# Patient Record
Sex: FEMALE | Race: WHITE | NOT HISPANIC OR LATINO | URBAN - METROPOLITAN AREA
[De-identification: names, ages, dates, MRNs, and addresses within clinical notes are randomized per-mention and may not be internally consistent; named-entity substitution may affect disease eponyms.]

---

## 2020-01-06 ENCOUNTER — HOSPITAL ENCOUNTER (OUTPATIENT)
Dept: URGENT CARE | Facility: CLINIC | Age: 20
Discharge: HOME | End: 2020-01-06
Attending: PHYSICIAN ASSISTANT

## 2020-12-28 ENCOUNTER — HOSPITAL ENCOUNTER (OUTPATIENT)
Dept: URGENT CARE | Facility: CLINIC | Age: 20
Discharge: HOME | End: 2020-12-28
Attending: PHYSICIAN ASSISTANT

## 2020-12-30 LAB — SARS-COV-2 RNA SPEC QL NAA+PROBE: NOT DETECTED

## 2021-03-09 ENCOUNTER — HOSPITAL ENCOUNTER (OUTPATIENT)
Dept: URGENT CARE | Facility: CLINIC | Age: 21
Discharge: HOME | End: 2021-03-09
Attending: EMERGENCY MEDICINE

## 2021-03-11 LAB — SARS-COV-2 RNA SPEC QL NAA+PROBE: NOT DETECTED

## 2021-04-02 ENCOUNTER — HOSPITAL ENCOUNTER (OUTPATIENT)
Dept: URGENT CARE | Facility: CLINIC | Age: 21
Discharge: HOME | End: 2021-04-02
Attending: EMERGENCY MEDICINE

## 2021-04-03 LAB — SARS-COV-2 RNA SPEC QL NAA+PROBE: NOT DETECTED

## 2021-04-19 ENCOUNTER — HOSPITAL ENCOUNTER (OUTPATIENT)
Dept: URGENT CARE | Facility: CLINIC | Age: 21
Discharge: HOME | End: 2021-04-19
Attending: EMERGENCY MEDICINE

## 2021-04-20 LAB — SARS-COV-2 RNA SPEC QL NAA+PROBE: NOT DETECTED

## 2023-07-18 ENCOUNTER — PATIENT ROUNDING (BHMG ONLY) (OUTPATIENT)
Dept: OBSTETRICS AND GYNECOLOGY | Facility: CLINIC | Age: 23
End: 2023-07-18

## 2023-07-18 NOTE — PROGRESS NOTES
A My-Chart message has been sent to the patient for PATIENT ROUNDING with [facility].

## 2023-08-16 ENCOUNTER — OFFICE VISIT (OUTPATIENT)
Dept: OBSTETRICS AND GYNECOLOGY | Facility: CLINIC | Age: 23
End: 2023-08-16
Payer: COMMERCIAL

## 2023-08-16 VITALS
DIASTOLIC BLOOD PRESSURE: 79 MMHG | BODY MASS INDEX: 37.03 KG/M2 | HEART RATE: 105 BPM | SYSTOLIC BLOOD PRESSURE: 124 MMHG | HEIGHT: 63 IN | WEIGHT: 209 LBS

## 2023-08-16 DIAGNOSIS — R63.5 WEIGHT GAIN: ICD-10-CM

## 2023-08-16 DIAGNOSIS — L68.0 FEMALE HIRSUTISM: ICD-10-CM

## 2023-08-16 DIAGNOSIS — N91.4 SECONDARY OLIGOMENORRHEA: Primary | ICD-10-CM

## 2023-08-16 DIAGNOSIS — Z32.02 NEGATIVE PREGNANCY TEST: ICD-10-CM

## 2023-08-16 DIAGNOSIS — L70.9 ACNE, UNSPECIFIED ACNE TYPE: ICD-10-CM

## 2023-08-16 LAB
B-HCG UR QL: NEGATIVE
EXPIRATION DATE: NORMAL
INTERNAL NEGATIVE CONTROL: NEGATIVE
INTERNAL POSITIVE CONTROL: POSITIVE
Lab: NORMAL

## 2023-08-16 RX ORDER — CITALOPRAM 40 MG/1
1 TABLET ORAL DAILY
COMMUNITY
Start: 2023-08-02

## 2023-08-16 NOTE — PROGRESS NOTES
"GYN Problem/Follow Up Visit    Chief Complaint   Patient presents with    Discuss BC           HPI  Asha Araujo is a 23 y.o. female, , who presents for skipping menses. States long hx of skipping. Has skipped almost one year in the past. Also c/o weight gain, acne, and unwanted facial and chin hair.  is  and has not been on bc and has never gotten pregnant. Has never had any workup for these sx.       Additional OB/GYN History   Patient's last menstrual period was 2023 (approximate).  Current contraception: contraceptive methods: None  Allergies : Amoxicillin     The additional following portions of the patient's history were reviewed and updated as appropriate: allergies, current medications, past family history, past medical history, past social history, past surgical history, and problem list.    Review of Systems    I have reviewed and agree with the HPI, ROS, and historical information as entered above. Maritza Hopkins, APRN    Objective   /79   Pulse 105   Ht 160 cm (63\")   Wt 94.8 kg (209 lb)   LMP 2023 (Approximate)   BMI 37.02 kg/mý     Physical Exam  Vitals reviewed.   Neurological:      Mental Status: She is alert and oriented to person, place, and time.          Assessment and Plan    Diagnoses and all orders for this visit:    1. Secondary oligomenorrhea (Primary)  -     17-Hydroxyprogesterone; Future  -     Comprehensive Metabolic Panel; Future  -     DHEA-Sulfate; Future  -     Testosterone, Bioavailable (M); Future  -     Insulin, Total; Future  -     Prolactin; Future  -     T4, Free; Future  -     TSH; Future  -     US Non-ob Transvaginal; Future    2. Weight gain  -     17-Hydroxyprogesterone; Future  -     Comprehensive Metabolic Panel; Future  -     DHEA-Sulfate; Future  -     Testosterone, Bioavailable (M); Future  -     Insulin, Total; Future  -     Prolactin; Future  -     T4, Free; Future  -     TSH; Future  -     US Non-ob Transvaginal; Future    3. " Acne, unspecified acne type  -     17-Hydroxyprogesterone; Future  -     Comprehensive Metabolic Panel; Future  -     DHEA-Sulfate; Future  -     Testosterone, Bioavailable (M); Future  -     Insulin, Total; Future  -     Prolactin; Future  -     T4, Free; Future  -     TSH; Future  -     US Non-ob Transvaginal; Future    4. Female hirsutism  -     17-Hydroxyprogesterone; Future  -     Comprehensive Metabolic Panel; Future  -     DHEA-Sulfate; Future  -     Testosterone, Bioavailable (M); Future  -     Insulin, Total; Future  -     Prolactin; Future  -     T4, Free; Future  -     TSH; Future  -     US Non-ob Transvaginal; Future    5. Negative pregnancy test  -     POC Pregnancy, Urine    Will check fasting pcos panel and pelvic u/s. Discussed tx options including provera and bc options. We will discuss further at f/u appt. Urine preg test negative.    Counseling:  She understands the importance of having the above orders performed in a timely fashion.  She is encouraged to review her results online and/or contact or office if she has questions.     Follow Up:  Return for fasting pcos panel, then lab and u/s f/u.      ARON Garcia  08/16/2023

## 2023-08-17 ENCOUNTER — LAB (OUTPATIENT)
Dept: OBSTETRICS AND GYNECOLOGY | Facility: CLINIC | Age: 23
End: 2023-08-17
Payer: COMMERCIAL

## 2023-08-17 DIAGNOSIS — L70.9 ACNE, UNSPECIFIED ACNE TYPE: ICD-10-CM

## 2023-08-17 DIAGNOSIS — R63.5 WEIGHT GAIN: ICD-10-CM

## 2023-08-17 DIAGNOSIS — N91.4 SECONDARY OLIGOMENORRHEA: ICD-10-CM

## 2023-08-17 DIAGNOSIS — L68.0 FEMALE HIRSUTISM: ICD-10-CM

## 2023-08-17 LAB
ALBUMIN SERPL-MCNC: 4.4 G/DL (ref 3.5–5.2)
ALBUMIN/GLOB SERPL: 1.7 G/DL
ALP SERPL-CCNC: 82 U/L (ref 39–117)
ALT SERPL W P-5'-P-CCNC: 21 U/L (ref 1–33)
ANION GAP SERPL CALCULATED.3IONS-SCNC: 9.6 MMOL/L (ref 5–15)
AST SERPL-CCNC: 21 U/L (ref 1–32)
BILIRUB SERPL-MCNC: 0.3 MG/DL (ref 0–1.2)
BUN SERPL-MCNC: 10 MG/DL (ref 6–20)
BUN/CREAT SERPL: 13.5 (ref 7–25)
CALCIUM SPEC-SCNC: 9.6 MG/DL (ref 8.6–10.5)
CHLORIDE SERPL-SCNC: 105 MMOL/L (ref 98–107)
CO2 SERPL-SCNC: 24.4 MMOL/L (ref 22–29)
CREAT SERPL-MCNC: 0.74 MG/DL (ref 0.57–1)
EGFRCR SERPLBLD CKD-EPI 2021: 115.3 ML/MIN/1.73
GLOBULIN UR ELPH-MCNC: 2.6 GM/DL
GLUCOSE SERPL-MCNC: 102 MG/DL (ref 65–99)
POTASSIUM SERPL-SCNC: 4.4 MMOL/L (ref 3.5–5.2)
PROLACTIN SERPL-MCNC: 15.9 NG/ML (ref 4.79–23.3)
PROT SERPL-MCNC: 7 G/DL (ref 6–8.5)
SODIUM SERPL-SCNC: 139 MMOL/L (ref 136–145)
T4 FREE SERPL-MCNC: 0.91 NG/DL (ref 0.93–1.7)
TSH SERPL DL<=0.05 MIU/L-ACNC: 1.7 UIU/ML (ref 0.27–4.2)

## 2023-08-17 PROCEDURE — 84146 ASSAY OF PROLACTIN: CPT | Performed by: OBSTETRICS & GYNECOLOGY

## 2023-08-17 PROCEDURE — 80053 COMPREHEN METABOLIC PANEL: CPT | Performed by: OBSTETRICS & GYNECOLOGY

## 2023-08-17 PROCEDURE — 84439 ASSAY OF FREE THYROXINE: CPT | Performed by: OBSTETRICS & GYNECOLOGY

## 2023-08-17 PROCEDURE — 84443 ASSAY THYROID STIM HORMONE: CPT | Performed by: OBSTETRICS & GYNECOLOGY

## 2023-08-18 LAB
DHEA-S SERPL-MCNC: 340 UG/DL (ref 84.8–378)
INSULIN SERPL-ACNC: 27.9 UIU/ML (ref 2.6–24.9)

## 2023-08-20 LAB — 17OHP SERPL-MCNC: 46 NG/DL

## 2023-08-21 ENCOUNTER — TELEPHONE (OUTPATIENT)
Dept: OBSTETRICS AND GYNECOLOGY | Facility: CLINIC | Age: 23
End: 2023-08-21
Payer: COMMERCIAL

## 2023-08-25 LAB
TESTOST SERPL-MCNC: 32 NG/DL
TESTOSTERONE.FREE+WB MFR SERPL: 57.2 %
TESTOSTERONE.FREE+WB SERPL-MCNC: 18 NG/DL

## 2023-08-28 ENCOUNTER — TELEPHONE (OUTPATIENT)
Dept: OBSTETRICS AND GYNECOLOGY | Facility: CLINIC | Age: 23
End: 2023-08-28
Payer: COMMERCIAL

## 2023-09-07 ENCOUNTER — HOSPITAL ENCOUNTER (OUTPATIENT)
Dept: ULTRASOUND IMAGING | Facility: HOSPITAL | Age: 23
Discharge: HOME | End: 2023-09-07
Admitting: OBSTETRICS & GYNECOLOGY
Payer: COMMERCIAL

## 2023-09-07 DIAGNOSIS — L68.0 FEMALE HIRSUTISM: ICD-10-CM

## 2023-09-07 DIAGNOSIS — L70.9 ACNE, UNSPECIFIED ACNE TYPE: ICD-10-CM

## 2023-09-07 DIAGNOSIS — N91.4 SECONDARY OLIGOMENORRHEA: ICD-10-CM

## 2023-09-07 DIAGNOSIS — R63.5 WEIGHT GAIN: ICD-10-CM

## 2023-09-07 PROCEDURE — 76830 TRANSVAGINAL US NON-OB: CPT

## 2023-09-11 ENCOUNTER — TELEPHONE (OUTPATIENT)
Dept: OBSTETRICS AND GYNECOLOGY | Facility: CLINIC | Age: 23
End: 2023-09-11
Payer: COMMERCIAL

## 2023-09-21 ENCOUNTER — OFFICE VISIT (OUTPATIENT)
Dept: OBSTETRICS AND GYNECOLOGY | Facility: CLINIC | Age: 23
End: 2023-09-21
Payer: COMMERCIAL

## 2023-09-21 VITALS
BODY MASS INDEX: 36.75 KG/M2 | SYSTOLIC BLOOD PRESSURE: 113 MMHG | WEIGHT: 207.4 LBS | DIASTOLIC BLOOD PRESSURE: 77 MMHG | HEART RATE: 85 BPM | HEIGHT: 63 IN

## 2023-09-21 DIAGNOSIS — Z30.011 ENCOUNTER FOR INITIAL PRESCRIPTION OF CONTRACEPTIVE PILLS: ICD-10-CM

## 2023-09-21 DIAGNOSIS — N91.4 SECONDARY OLIGOMENORRHEA: Primary | ICD-10-CM

## 2023-09-21 DIAGNOSIS — E88.81 INSULIN RESISTANCE: ICD-10-CM

## 2023-09-21 RX ORDER — NORGESTIMATE AND ETHINYL ESTRADIOL 7DAYSX3 LO
1 KIT ORAL DAILY
Qty: 84 TABLET | Refills: 3 | Status: SHIPPED | OUTPATIENT
Start: 2023-09-21 | End: 2024-09-20

## 2023-09-21 NOTE — PROGRESS NOTES
"GYN Problem/Follow Up Visit    Chief Complaint   Patient presents with    FOLLOW UP LABS AND ULTRASOUND           HPI  Asha Araujo is a 23 y.o. female, , who presents for lab and u/s f/u. Seen for skipping menses, acne, and facial hair. No new concerns.       Additional OB/GYN History   Patient's last menstrual period was 2023 (approximate).  Current contraception: contraceptive methods: None  Desires to: start contraception  Allergies : Amoxicillin     The additional following portions of the patient's history were reviewed and updated as appropriate: allergies, current medications, past family history, past medical history, past social history, past surgical history, and problem list.    Review of Systems    I have reviewed and agree with the HPI, ROS, and historical information as entered above. Maritza Hopkins, APRN    Objective   /77   Pulse 85   Ht 160 cm (63\")   Wt 94.1 kg (207 lb 6.4 oz)   LMP 2023 (Approximate)   BMI 36.74 kg/m²     Physical Exam  Vitals reviewed.   Neurological:      Mental Status: She is alert and oriented to person, place, and time.          Assessment and Plan    Diagnoses and all orders for this visit:    1. Secondary oligomenorrhea (Primary)  -     norgestimate-ethinyl estradiol (Ortho Tri-Cyclen Lo) 0.18/0.215/0.25 MG-25 MCG per tablet; Take 1 tablet by mouth Daily.  Dispense: 84 tablet; Refill: 3    2. Insulin resistance  -     metFORMIN (GLUCOPHAGE) 850 MG tablet; Take 1 tablet by mouth 2 (Two) Times a Day With Meals.  Dispense: 60 tablet; Refill: 11    3. Encounter for initial prescription of contraceptive pills  -     norgestimate-ethinyl estradiol (Ortho Tri-Cyclen Lo) 0.18/0.215/0.25 MG-25 MCG per tablet; Take 1 tablet by mouth Daily.  Dispense: 84 tablet; Refill: 3    Reviewed pcos panel done 23: testosterone slightly elevated at 18, insulin 27.9, glucose 102, free t4 0.91. reviewed pelvic u/s done 23: normal. Discussed possible borderline " pcos and insulin resistance and tx options including r/b/se. Pt desires trying metformin. She will also work on decreasing carbs/sugars. She also desires starting bcp to help with sx. She has seen her pcp since the last ov and they reviewed everything. She will f/u here prn.     Counseling:  She understands the importance of having the above orders performed in a timely fashion.  She is encouraged to review her results online and/or contact or office if she has questions.     Follow Up:  Return if symptoms worsen or fail to improve.      Maritza Hopkins, APRN  09/21/2023

## 2024-09-11 DIAGNOSIS — Z30.011 ENCOUNTER FOR INITIAL PRESCRIPTION OF CONTRACEPTIVE PILLS: ICD-10-CM

## 2024-09-11 DIAGNOSIS — N91.4 SECONDARY OLIGOMENORRHEA: ICD-10-CM

## 2024-09-11 RX ORDER — NORGESTIMATE AND ETHINYL ESTRADIOL 7DAYSX3 LO
1 KIT ORAL DAILY
Qty: 84 TABLET | Refills: 0 | Status: SHIPPED | OUTPATIENT
Start: 2024-09-11 | End: 2025-09-11

## 2024-10-29 ENCOUNTER — TRANSCRIBE ORDERS (OUTPATIENT)
Dept: ADMINISTRATIVE | Facility: HOSPITAL | Age: 24
End: 2024-10-29
Payer: COMMERCIAL

## 2024-10-29 DIAGNOSIS — R19.7 NAUSEA, VOMITING AND DIARRHEA: Primary | ICD-10-CM

## 2024-10-29 DIAGNOSIS — R11.2 NAUSEA, VOMITING AND DIARRHEA: Primary | ICD-10-CM

## 2024-12-09 DIAGNOSIS — Z30.011 ENCOUNTER FOR INITIAL PRESCRIPTION OF CONTRACEPTIVE PILLS: ICD-10-CM

## 2024-12-09 DIAGNOSIS — N91.4 SECONDARY OLIGOMENORRHEA: ICD-10-CM

## 2024-12-10 RX ORDER — NORGESTIMATE AND ETHINYL ESTRADIOL 7DAYSX3 LO
1 KIT ORAL DAILY
Qty: 84 TABLET | Refills: 0 | Status: SHIPPED | OUTPATIENT
Start: 2024-12-10

## 2025-01-17 ENCOUNTER — CLINICAL SUPPORT (OUTPATIENT)
Dept: FAMILY MEDICINE CLINIC | Facility: CLINIC | Age: 25
End: 2025-01-17
Payer: COMMERCIAL

## 2025-01-17 DIAGNOSIS — R05.1 ACUTE COUGH: Primary | ICD-10-CM

## 2025-01-17 LAB
EXPIRATION DATE: NORMAL
FLUAV AG UPPER RESP QL IA.RAPID: NOT DETECTED
FLUBV AG UPPER RESP QL IA.RAPID: NOT DETECTED
INTERNAL CONTROL: NORMAL
Lab: NORMAL
SARS-COV-2 AG UPPER RESP QL IA.RAPID: NOT DETECTED

## 2025-01-23 ENCOUNTER — LAB (OUTPATIENT)
Dept: LAB | Facility: HOSPITAL | Age: 25
End: 2025-01-23
Payer: COMMERCIAL

## 2025-01-23 ENCOUNTER — OFFICE VISIT (OUTPATIENT)
Dept: FAMILY MEDICINE CLINIC | Facility: CLINIC | Age: 25
End: 2025-01-23
Payer: COMMERCIAL

## 2025-01-23 VITALS
SYSTOLIC BLOOD PRESSURE: 116 MMHG | OXYGEN SATURATION: 96 % | WEIGHT: 228 LBS | HEART RATE: 91 BPM | BODY MASS INDEX: 41.96 KG/M2 | DIASTOLIC BLOOD PRESSURE: 43 MMHG | HEIGHT: 62 IN

## 2025-01-23 DIAGNOSIS — Z13.220 SCREENING FOR LIPID DISORDERS: ICD-10-CM

## 2025-01-23 DIAGNOSIS — Z11.59 NEED FOR HEPATITIS C SCREENING TEST: ICD-10-CM

## 2025-01-23 DIAGNOSIS — R19.7 DIARRHEA, UNSPECIFIED TYPE: ICD-10-CM

## 2025-01-23 DIAGNOSIS — Z91.018 FOOD ALLERGY: ICD-10-CM

## 2025-01-23 DIAGNOSIS — Z76.89 ENCOUNTER TO ESTABLISH CARE: ICD-10-CM

## 2025-01-23 DIAGNOSIS — R10.9 ABDOMINAL CRAMPS: ICD-10-CM

## 2025-01-23 DIAGNOSIS — Z00.00 ANNUAL PHYSICAL EXAM: ICD-10-CM

## 2025-01-23 DIAGNOSIS — E88.819 INSULIN RESISTANCE: ICD-10-CM

## 2025-01-23 DIAGNOSIS — F41.9 ANXIETY: ICD-10-CM

## 2025-01-23 DIAGNOSIS — Z23 NEED FOR TDAP VACCINATION: ICD-10-CM

## 2025-01-23 DIAGNOSIS — Z13.29 SCREENING FOR THYROID DISORDER: ICD-10-CM

## 2025-01-23 DIAGNOSIS — Z00.00 ANNUAL PHYSICAL EXAM: Primary | ICD-10-CM

## 2025-01-23 DIAGNOSIS — F32.A DEPRESSION, UNSPECIFIED DEPRESSION TYPE: ICD-10-CM

## 2025-01-23 LAB
ALBUMIN SERPL-MCNC: 3.8 G/DL (ref 3.5–5.2)
ALBUMIN/GLOB SERPL: 1.4 G/DL
ALP SERPL-CCNC: 85 U/L (ref 39–117)
ALT SERPL W P-5'-P-CCNC: 24 U/L (ref 1–33)
ANION GAP SERPL CALCULATED.3IONS-SCNC: 12.7 MMOL/L (ref 5–15)
AST SERPL-CCNC: 16 U/L (ref 1–32)
BASOPHILS # BLD AUTO: 0.06 10*3/MM3 (ref 0–0.2)
BASOPHILS NFR BLD AUTO: 0.5 % (ref 0–1.5)
BILIRUB SERPL-MCNC: <0.2 MG/DL (ref 0–1.2)
BUN SERPL-MCNC: 11 MG/DL (ref 6–20)
BUN/CREAT SERPL: 15.1 (ref 7–25)
CALCIUM SPEC-SCNC: 9.2 MG/DL (ref 8.6–10.5)
CHLORIDE SERPL-SCNC: 104 MMOL/L (ref 98–107)
CHOLEST SERPL-MCNC: 216 MG/DL (ref 0–200)
CO2 SERPL-SCNC: 21.3 MMOL/L (ref 22–29)
CREAT SERPL-MCNC: 0.73 MG/DL (ref 0.57–1)
DEPRECATED RDW RBC AUTO: 40.3 FL (ref 37–54)
EGFRCR SERPLBLD CKD-EPI 2021: 116.5 ML/MIN/1.73
EOSINOPHIL # BLD AUTO: 0.19 10*3/MM3 (ref 0–0.4)
EOSINOPHIL NFR BLD AUTO: 1.7 % (ref 0.3–6.2)
ERYTHROCYTE [DISTWIDTH] IN BLOOD BY AUTOMATED COUNT: 12.2 % (ref 12.3–15.4)
GLOBULIN UR ELPH-MCNC: 2.8 GM/DL
GLUCOSE SERPL-MCNC: 95 MG/DL (ref 65–99)
HCT VFR BLD AUTO: 41.6 % (ref 34–46.6)
HCV AB SER QL: NORMAL
HDLC SERPL-MCNC: 47 MG/DL (ref 40–60)
HGB BLD-MCNC: 14.2 G/DL (ref 12–15.9)
IMM GRANULOCYTES # BLD AUTO: 0.16 10*3/MM3 (ref 0–0.05)
IMM GRANULOCYTES NFR BLD AUTO: 1.4 % (ref 0–0.5)
LDLC SERPL CALC-MCNC: 130 MG/DL (ref 0–100)
LDLC/HDLC SERPL: 2.65 {RATIO}
LYMPHOCYTES # BLD AUTO: 2.18 10*3/MM3 (ref 0.7–3.1)
LYMPHOCYTES NFR BLD AUTO: 19.2 % (ref 19.6–45.3)
MCH RBC QN AUTO: 31.1 PG (ref 26.6–33)
MCHC RBC AUTO-ENTMCNC: 34.1 G/DL (ref 31.5–35.7)
MCV RBC AUTO: 91.2 FL (ref 79–97)
MONOCYTES # BLD AUTO: 0.99 10*3/MM3 (ref 0.1–0.9)
MONOCYTES NFR BLD AUTO: 8.7 % (ref 5–12)
NEUTROPHILS NFR BLD AUTO: 68.5 % (ref 42.7–76)
NEUTROPHILS NFR BLD AUTO: 7.8 10*3/MM3 (ref 1.7–7)
NRBC BLD AUTO-RTO: 0 /100 WBC (ref 0–0.2)
PLATELET # BLD AUTO: 293 10*3/MM3 (ref 140–450)
PMV BLD AUTO: 10.3 FL (ref 6–12)
POTASSIUM SERPL-SCNC: 4.2 MMOL/L (ref 3.5–5.2)
PROT SERPL-MCNC: 6.6 G/DL (ref 6–8.5)
RBC # BLD AUTO: 4.56 10*6/MM3 (ref 3.77–5.28)
SODIUM SERPL-SCNC: 138 MMOL/L (ref 136–145)
T4 FREE SERPL-MCNC: 0.88 NG/DL (ref 0.92–1.68)
TRIGL SERPL-MCNC: 222 MG/DL (ref 0–150)
TSH SERPL DL<=0.05 MIU/L-ACNC: 3.76 UIU/ML (ref 0.27–4.2)
VLDLC SERPL-MCNC: 39 MG/DL (ref 5–40)
WBC NRBC COR # BLD AUTO: 11.38 10*3/MM3 (ref 3.4–10.8)

## 2025-01-23 PROCEDURE — 86803 HEPATITIS C AB TEST: CPT

## 2025-01-23 PROCEDURE — 86364 TISS TRNSGLTMNASE EA IG CLAS: CPT

## 2025-01-23 PROCEDURE — 80061 LIPID PANEL: CPT

## 2025-01-23 PROCEDURE — 36415 COLL VENOUS BLD VENIPUNCTURE: CPT

## 2025-01-23 PROCEDURE — 99214 OFFICE O/P EST MOD 30 MIN: CPT

## 2025-01-23 PROCEDURE — 80050 GENERAL HEALTH PANEL: CPT

## 2025-01-23 PROCEDURE — 90471 IMMUNIZATION ADMIN: CPT

## 2025-01-23 PROCEDURE — 86231 EMA EACH IG CLASS: CPT

## 2025-01-23 PROCEDURE — 99395 PREV VISIT EST AGE 18-39: CPT

## 2025-01-23 PROCEDURE — 84439 ASSAY OF FREE THYROXINE: CPT

## 2025-01-23 PROCEDURE — 82784 ASSAY IGA/IGD/IGG/IGM EACH: CPT

## 2025-01-23 PROCEDURE — 90715 TDAP VACCINE 7 YRS/> IM: CPT

## 2025-01-23 RX ORDER — METFORMIN HYDROCHLORIDE 500 MG/1
500 TABLET, EXTENDED RELEASE ORAL
Qty: 30 TABLET | Refills: 0 | Status: SHIPPED | OUTPATIENT
Start: 2025-01-23

## 2025-01-23 RX ORDER — DICYCLOMINE HYDROCHLORIDE 10 MG/1
10 CAPSULE ORAL
Qty: 180 CAPSULE | Refills: 0 | Status: SHIPPED | OUTPATIENT
Start: 2025-01-23

## 2025-01-23 RX ORDER — LEVETIRACETAM 250 MG/1
250 TABLET ORAL 2 TIMES DAILY
COMMUNITY
Start: 2025-01-16 | End: 2026-01-16

## 2025-01-23 NOTE — LETTER
[facility]  Vaccine Consent Form    Patient Name:  Asha Araujo  Patient :  2000        Screening Checklist  The following questions should be completed prior to vaccination. If you answer “yes” to any question, it does not necessarily mean you should not be vaccinated. It just means we may need to clarify or ask more questions. If a question is unclear, please ask your healthcare provider to explain it.    Yes No   Any fever or moderate to severe illness today (mild illness and/or antibiotic treatment are not contraindications)?     Do you have a history of a serious reaction to any previous vaccinations, such as anaphylaxis, encephalopathy within 7 days, Guillain-Greenwood syndrome within 6 weeks, seizure?     Have you received any live vaccine(s) (e.g MMR, EBONY) or any other vaccines in the last month (to ensure duplicate doses aren't given)?     Do you have an anaphylactic allergy to latex (DTaP, DTaP-IPV, Hep A, Hep B, MenB, RV, Td, Tdap), baker’s yeast (Hep B, HPV), polysorbates (RSV, nirsevimab, PCV 20, Rotavirrus, Tdap, Shingrix), or gelatin (EBONY, MMR)?     Do you have an anaphylactic allergy to neomycin (Rabies, EBONY, MMR, IPV, Hep A), polymyxin B (IPV), or streptomycin (IPV)?      Any cancer, leukemia, AIDS, or other immune system disorder? (EBONY, MMR, RV)     Do you have a parent, brother, or sister with an immune system problem (if immune competence of vaccine recipient clinically verified, can proceed)? (MMR, EBONY)     Any recent steroid treatments for >2 weeks, chemotherapy, or radiation treatment? (EBONY, MMR)     Have you received antibody-containing blood transfusions or IVIG in the past 11 months (recommended interval is dependent on product)? (MMR, EBONY)     Have you taken antiviral drugs (acyclovir, famciclovir, valacyclovir for EBONY) in the last 24 or 48 hours, respectively?      Are you pregnant or planning to become pregnant within 1 month? (EBONY, MMR, HPV, IPV, MenB, Abrexvy; For Hep B-  "refer to Engerix-B; For RSV - Abrysvo is indicated for 32-36 weeks of pregnancy from September to January)     For infants, have you ever been told your child has had intussusception or a medical emergency involving obstruction of the intestine (Rotavirus)? If not for an infant, can skip this question.         *Ordering Physicians/APC should be consulted if \"yes\" is checked by the patient or guardian above.  I have received, read, and understand the Vaccine Information Statement (VIS) for each vaccine ordered.  I have considered my or my child's health status as well as the health status of my close contacts.  I have taken the opportunity to discuss my vaccine questions with my or my child's health care provider.   I have requested that the ordered vaccine(s) be given to me or my child.  I understand the benefits and risks of the vaccines.  I understand that I should remain in the clinic for 15 minutes after receiving the vaccine(s).  _________________________________________________________  Signature of Patient or Parent/Legal Guardian ____________________  Date     "

## 2025-01-23 NOTE — PROGRESS NOTES
Chief Complaint  Establish Care and Annual Exam    SUBJECTIVE  Asha Araujo presents to [facility]    History of Present Illness  Patient is a 24-year-old female who presents today to establish care.  Previous PCP was ARON Rojas. She is due annual physical exam, to be done in office today.  Needs chronic condition management of anxiety and depression. She is on Celexa, Rexulti and Buspar. She feels stable on these at this time. She would like to be referred to psych to for potential therapy.     She has been tested for food allergist by previous PCP. They came back positive but she is not sure what for.She was referred to allergist but they id not take her insurance at the time. She would like a new referral to go over her allergies. She has been having diarrhea and ABD pain after eating for a couple of years now. Denies any family hx of celiac. She notices raw fruits and veggies really trigger her.     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     PAP- 7/18/2023- Maritza Hopkins. JAGUAR is filling her birth control and had prescribed Metformin for Insulin resistance, but the pt states that it bother her GI tract. She said that she has not been taking. She was RX immediate release 850, never tried XR form. She repots if she goes extended period of time without eating  she has weakness, sweating, all signs of hypoglycemia.     TDAP- today    HPV- Pt believes that she has had HPV vaccine and will check previous records.     Patient is due labs. Orders placed at today's visit. Discussed with patient that these are fasting labs.     No other concerns or complaints at this time.  Patient denies any constipation, blood in stool, urinary urgency, frequency, or dysuria, chest pain, shortness of breath or syncope.           Past Medical History:   Diagnosis Date    Anxiety     Depression       Family History   Problem Relation Age of Onset    COPD  "Mother     Depression Mother     Anxiety disorder Father     Diabetes Paternal Grandmother     Breast cancer Neg Hx     Ovarian cancer Neg Hx     Uterine cancer Neg Hx     Colon cancer Neg Hx     Melanoma Neg Hx     Prostate cancer Neg Hx     Deep vein thrombosis Neg Hx     Pulmonary embolism Neg Hx       History reviewed. No pertinent surgical history.     Current Outpatient Medications:     albuterol sulfate  (90 Base) MCG/ACT inhaler, Inhale 2 puffs Every 6 (Six) Hours As Needed for Wheezing for up to 14 days., Disp: 6.7 g, Rfl: 0    busPIRone (BUSPAR) 5 MG tablet, Take 1 tablet by mouth 3 (Three) Times a Day As Needed., Disp: , Rfl:     citalopram (CeleXA) 40 MG tablet, Take 1 tablet by mouth Daily., Disp: , Rfl:     levETIRAcetam (KEPPRA) 250 MG tablet, Take 1 tablet by mouth 2 (Two) Times a Day., Disp: , Rfl:     Rexulti 1 MG tablet, Take 1 tablet by mouth Daily., Disp: , Rfl:     Tri-Lo-Genoveva 0.18/0.215/0.25 MG-25 MCG tablet, TAKE 1 TABLET BY MOUTH DAILY, Disp: 84 tablet, Rfl: 0    vitamin D3 125 MCG (5000 UT) capsule capsule, Take 1 tablet by mouth Daily., Disp: , Rfl:     dicyclomine (BENTYL) 10 MG capsule, Take 1 capsule by mouth 4 (Four) Times a Day Before Meals & at Bedtime., Disp: 180 capsule, Rfl: 0    metFORMIN ER (GLUCOPHAGE-XR) 500 MG 24 hr tablet, Take 1 tablet by mouth Daily With Breakfast., Disp: 30 tablet, Rfl: 0    OBJECTIVE  Vital Signs:   /43 (BP Location: Left arm, Patient Position: Sitting, Cuff Size: Large Adult)   Pulse 91   Ht 157.5 cm (62\")   Wt 103 kg (228 lb)   LMP 12/30/2024 (Approximate)   SpO2 96%   BMI 41.70 kg/m²    Estimated body mass index is 41.7 kg/m² as calculated from the following:    Height as of this encounter: 157.5 cm (62\").    Weight as of this encounter: 103 kg (228 lb).     Wt Readings from Last 3 Encounters:   01/23/25 103 kg (228 lb)   01/12/25 103 kg (227 lb 6.4 oz)   11/12/24 104 kg (229 lb)     BP Readings from Last 3 Encounters:   01/23/25 " 116/43   01/12/25 111/72   11/12/24 127/74       Physical Exam  Vitals reviewed.   Constitutional:       General: She is awake. She is not in acute distress.     Appearance: She is well-groomed. She is morbidly obese. She is not ill-appearing.   HENT:      Head: Normocephalic and atraumatic.      Right Ear: Tympanic membrane, ear canal and external ear normal.      Left Ear: Tympanic membrane, ear canal and external ear normal.      Nose: Nose normal.      Mouth/Throat:      Mouth: Mucous membranes are moist.      Pharynx: Oropharynx is clear. No oropharyngeal exudate or posterior oropharyngeal erythema.   Eyes:      Extraocular Movements: Extraocular movements intact.      Conjunctiva/sclera: Conjunctivae normal.      Pupils: Pupils are equal, round, and reactive to light.   Neck:      Thyroid: No thyroid mass, thyromegaly or thyroid tenderness.   Cardiovascular:      Rate and Rhythm: Normal rate and regular rhythm.      Heart sounds: Normal heart sounds.   Pulmonary:      Effort: Pulmonary effort is normal.      Breath sounds: Normal breath sounds.   Abdominal:      General: Bowel sounds are normal. There is no distension.      Palpations: Abdomen is soft.      Tenderness: There is abdominal tenderness.   Musculoskeletal:         General: Normal range of motion.      Cervical back: Normal range of motion and neck supple. No rigidity or tenderness.   Lymphadenopathy:      Cervical: No cervical adenopathy.   Skin:     General: Skin is warm and dry.   Neurological:      Mental Status: She is alert and oriented to person, place, and time.   Psychiatric:         Mood and Affect: Mood normal.         Behavior: Behavior normal. Behavior is cooperative.         Thought Content: Thought content normal.         Judgment: Judgment normal.          Result Review        No Images in the past 120 days found..      The above data has been reviewed by ARON Jackson 01/23/2025 07:12 EST.          Patient Care Team:  Lucy  ARON Rai as PCP - General (Nurse Practitioner)    Class 3 Severe Obesity (BMI >=40). Obesity-related health conditions include the following: none. Obesity is newly identified. BMI is is above average; BMI management plan is completed. We discussed low calorie, low carb based diet program, portion control, increasing exercise, and Information on healthy weight added to patient's after visit summary.       ASSESSMENT & PLAN    Diagnoses and all orders for this visit:    1. Annual physical exam (Primary)  Comments:  preventative counseling  healthy diet  daily exercise  get adequate sleep  Orders:  -     Comprehensive Metabolic Panel; Future  -     CBC & Differential; Future  -     Lipid Panel; Future  -     TSH+Free T4; Future  -     Hepatitis C antibody; Future    2. Encounter to establish care  Comments:  med hx and medications reviewed  Orders:  -     Comprehensive Metabolic Panel; Future  -     CBC & Differential; Future  -     Lipid Panel; Future  -     TSH+Free T4; Future  -     Hepatitis C antibody; Future    3. Screening for thyroid disorder  -     TSH+Free T4; Future    4. Screening for lipid disorders  -     Lipid Panel; Future    5. Need for hepatitis C screening test  -     Hepatitis C antibody; Future    6. Need for Tdap vaccination  -     Tdap Vaccine => 8yo IM (BOOSTRIX/ADACEL)    7. Anxiety  Comments:  stable on celexa, rexulti and buspar, continue, [facility]  Orders:  -     Ambulatory Referral to Behavioral Health    8. Depression, unspecified depression type  Comments:  stable on celexa, rexulti and buspar, continue, [facility]  Orders:  -     Ambulatory Referral to Behavioral Health    9. Insulin resistance  Comments:  start metformin  mg daily, 1 month follow up, try to healthy protein rich breakfast and snacks to keep glycemic level stable.  Orders:  -     metFORMIN ER (GLUCOPHAGE-XR) 500 MG 24 hr tablet; Take 1 tablet by mouth Daily With Breakfast.   Dispense: 30 tablet; Refill: 0    10. Food allergy  -     Ambulatory Referral to Allergy  -     Celiac Disease Panel; Future    11. Diarrhea, unspecified type  Comments:  start bentyl before meals, ref to allergy, avoid triggering foods, if  no relief will refer to GI.  Orders:  -     Celiac Disease Panel; Future  -     dicyclomine (BENTYL) 10 MG capsule; Take 1 capsule by mouth 4 (Four) Times a Day Before Meals & at Bedtime.  Dispense: 180 capsule; Refill: 0    12. Abdominal cramps  Comments:  trial bentyl, 1 month F/U  Orders:  -     dicyclomine (BENTYL) 10 MG capsule; Take 1 capsule by mouth 4 (Four) Times a Day Before Meals & at Bedtime.  Dispense: 180 capsule; Refill: 0         Tobacco Use: Low Risk  (1/23/2025)    Patient History     Smoking Tobacco Use: Never     Smokeless Tobacco Use: Never     Passive Exposure: Never       Follow Up     Return in about 1 month (around 2/23/2025) for Next scheduled follow up.      Patient was given instructions and counseling regarding her condition or for health maintenance advice. Please see specific information pulled into the AVS if appropriate.   I have reviewed information obtained and documented by others and I have confirmed the accuracy of this documented note.    ARON Jackson

## 2025-01-24 LAB
ENDOMYSIUM IGA SER QL: NEGATIVE
IGA SERPL-MCNC: 198 MG/DL (ref 87–352)
TTG IGA SER-ACNC: <2 U/ML (ref 0–3)

## 2025-02-19 ENCOUNTER — OFFICE VISIT (OUTPATIENT)
Dept: FAMILY MEDICINE CLINIC | Facility: CLINIC | Age: 25
End: 2025-02-19
Payer: COMMERCIAL

## 2025-02-19 VITALS
SYSTOLIC BLOOD PRESSURE: 130 MMHG | WEIGHT: 227.6 LBS | HEIGHT: 62 IN | BODY MASS INDEX: 41.88 KG/M2 | DIASTOLIC BLOOD PRESSURE: 80 MMHG | HEART RATE: 89 BPM | OXYGEN SATURATION: 97 %

## 2025-02-19 DIAGNOSIS — F32.A DEPRESSION, UNSPECIFIED DEPRESSION TYPE: ICD-10-CM

## 2025-02-19 DIAGNOSIS — E88.819 INSULIN RESISTANCE: Primary | ICD-10-CM

## 2025-02-19 DIAGNOSIS — R10.9 ABDOMINAL CRAMPS: ICD-10-CM

## 2025-02-19 PROCEDURE — 99214 OFFICE O/P EST MOD 30 MIN: CPT

## 2025-02-19 RX ORDER — BREXPIPRAZOLE 1 MG/1
2 TABLET ORAL DAILY
Qty: 60 TABLET | Refills: 1 | Status: SHIPPED | OUTPATIENT
Start: 2025-02-19

## 2025-02-19 RX ORDER — METFORMIN HYDROCHLORIDE 500 MG/1
500 TABLET, EXTENDED RELEASE ORAL
Qty: 30 TABLET | Refills: 0 | Status: SHIPPED | OUTPATIENT
Start: 2025-02-19

## 2025-02-19 NOTE — PROGRESS NOTES
Chief Complaint  Polycystic Ovary Syndrome and Abdominal Pain    SUBJECTIVE  Asha Araujo presents to [facility]    History of Present Illness  Patient is a 24-year-old male presents today for 1 month follow-up.  She was started on metformin extended release for PCOS/insulin resistance.  Patient was able to tolerate this better than the immediate release metformin she was put on prior.    Patient was started on Bentyl for abdominal pain and diarrhea especially after eating.  Patient reports this has improved her abdominal pain and her diarrhea.    She has noticed worsening depression. It is starting to effect her work. She has an appt with psych end of March but wishing to address now. No SI. She is on rexulti 1 mg and celexa 40 mg.     Past Medical History:   Diagnosis Date    Anxiety     Depression       Family History   Problem Relation Age of Onset    COPD Mother     Depression Mother     Anxiety disorder Father     Diabetes Paternal Grandmother     Breast cancer Neg Hx     Ovarian cancer Neg Hx     Uterine cancer Neg Hx     Colon cancer Neg Hx     Melanoma Neg Hx     Prostate cancer Neg Hx     Deep vein thrombosis Neg Hx     Pulmonary embolism Neg Hx       History reviewed. No pertinent surgical history.     Current Outpatient Medications:     busPIRone (BUSPAR) 5 MG tablet, Take 1 tablet by mouth 3 (Three) Times a Day As Needed., Disp: , Rfl:     citalopram (CeleXA) 40 MG tablet, Take 1 tablet by mouth Daily., Disp: , Rfl:     dicyclomine (BENTYL) 10 MG capsule, Take 1 capsule by mouth 4 (Four) Times a Day Before Meals & at Bedtime., Disp: 180 capsule, Rfl: 0    levETIRAcetam (KEPPRA) 250 MG tablet, Take 1 tablet by mouth 2 (Two) Times a Day., Disp: , Rfl:     metFORMIN ER (GLUCOPHAGE-XR) 500 MG 24 hr tablet, Take 1 tablet by mouth Daily With Breakfast., Disp: 30 tablet, Rfl: 0    Rexulti 1 MG tablet, Take 2 tablets by mouth Daily., Disp: 60 tablet, Rfl: 1    Tri-Lo-Genoveva  "0.18/0.215/0.25 MG-25 MCG tablet, TAKE 1 TABLET BY MOUTH DAILY, Disp: 84 tablet, Rfl: 0    vitamin D3 125 MCG (5000 UT) capsule capsule, Take 1 capsule by mouth Daily., Disp: 90 capsule, Rfl: 1    OBJECTIVE  Vital Signs:   /80 (BP Location: Left arm, Patient Position: Sitting, Cuff Size: Large Adult)   Pulse 89   Ht 157.5 cm (62.01\")   Wt 103 kg (227 lb 9.6 oz)   SpO2 97%   BMI 41.62 kg/m²    Estimated body mass index is 41.62 kg/m² as calculated from the following:    Height as of this encounter: 157.5 cm (62.01\").    Weight as of this encounter: 103 kg (227 lb 9.6 oz).     Wt Readings from Last 3 Encounters:   02/19/25 103 kg (227 lb 9.6 oz)   01/23/25 103 kg (228 lb)   01/12/25 103 kg (227 lb 6.4 oz)     BP Readings from Last 3 Encounters:   02/19/25 130/80   01/23/25 116/43   01/12/25 111/72       Physical Exam  Vitals reviewed.   Constitutional:       General: She is not in acute distress.     Appearance: She is not ill-appearing.   HENT:      Head: Normocephalic and atraumatic.   Eyes:      Conjunctiva/sclera: Conjunctivae normal.   Cardiovascular:      Rate and Rhythm: Normal rate and regular rhythm.      Heart sounds: Normal heart sounds.   Pulmonary:      Effort: Pulmonary effort is normal.      Breath sounds: Normal breath sounds.   Musculoskeletal:      Cervical back: Normal range of motion.   Neurological:      Mental Status: She is alert and oriented to person, place, and time.   Psychiatric:         Mood and Affect: Mood normal.         Behavior: Behavior normal.         Thought Content: Thought content normal.         Judgment: Judgment normal.          Result Review    Common labs          1/23/2025    08:44   Common Labs   Glucose 95    BUN 11    Creatinine 0.73    Sodium 138    Potassium 4.2    Chloride 104    Calcium 9.2    Albumin 3.8    Total Bilirubin <0.2    Alkaline Phosphatase 85    AST (SGOT) 16    ALT (SGPT) 24    WBC 11.38    Hemoglobin 14.2    Hematocrit 41.6    Platelets 293  "   Total Cholesterol 216    Triglycerides 222    HDL Cholesterol 47    LDL Cholesterol  130        No Images in the past 120 days found..      The above data has been reviewed by ARON Jackson 02/19/2025 15:00 EST.          Patient Care Team:  Cecelia Ayala APRN as PCP - General (Nurse Practitioner)            ASSESSMENT & PLAN    Diagnoses and all orders for this visit:    1. Insulin resistance (Primary)  Comments:  Tolerating metformin  mg well, continue, try to healthy protein rich breakfast and snacks to keep glycemic level stable.  Orders:  -     metFORMIN ER (GLUCOPHAGE-XR) 500 MG 24 hr tablet; Take 1 tablet by mouth Daily With Breakfast.  Dispense: 30 tablet; Refill: 0    2. Abdominal cramps  Comments:  improved with bentyl, continue    3. Depression, unspecified depression type  Comments:  increase rexulti to 2 mg. follow up with psych in 1 month.  Orders:  -     Rexulti 1 MG tablet; Take 2 tablets by mouth Daily.  Dispense: 60 tablet; Refill: 1         Tobacco Use: Low Risk  (2/19/2025)    Patient History     Smoking Tobacco Use: Never     Smokeless Tobacco Use: Never     Passive Exposure: Never       Follow Up     Return in about 6 months (around 8/19/2025) for Next scheduled follow up.      Patient was given instructions and counseling regarding her condition or for health maintenance advice. Please see specific information pulled into the AVS if appropriate.   I have reviewed information obtained and documented by others and I have confirmed the accuracy of this documented note.    ARON Jackson

## 2025-02-21 ENCOUNTER — TELEPHONE (OUTPATIENT)
Dept: FAMILY MEDICINE CLINIC | Facility: CLINIC | Age: 25
End: 2025-02-21
Payer: COMMERCIAL

## 2025-03-10 ENCOUNTER — PATIENT MESSAGE (OUTPATIENT)
Dept: OBSTETRICS AND GYNECOLOGY | Facility: CLINIC | Age: 25
End: 2025-03-10
Payer: COMMERCIAL

## 2025-03-10 DIAGNOSIS — Z30.011 ENCOUNTER FOR INITIAL PRESCRIPTION OF CONTRACEPTIVE PILLS: ICD-10-CM

## 2025-03-10 DIAGNOSIS — N91.4 SECONDARY OLIGOMENORRHEA: ICD-10-CM

## 2025-03-10 RX ORDER — NORGESTIMATE AND ETHINYL ESTRADIOL 7DAYSX3 LO
1 KIT ORAL DAILY
Qty: 84 TABLET | Refills: 0 | OUTPATIENT
Start: 2025-03-10

## 2025-03-10 NOTE — TELEPHONE ENCOUNTER
Pt requesting refill on BCP. Last refill sent 12/10/2024. Pt last seen 9/21/2023. Request denied at this time. Message sent to pt to schedule visit.

## 2025-03-12 DIAGNOSIS — N91.4 SECONDARY OLIGOMENORRHEA: ICD-10-CM

## 2025-03-12 DIAGNOSIS — Z30.011 ENCOUNTER FOR INITIAL PRESCRIPTION OF CONTRACEPTIVE PILLS: ICD-10-CM

## 2025-03-12 RX ORDER — NORGESTIMATE AND ETHINYL ESTRADIOL 7DAYSX3 LO
1 KIT ORAL DAILY
Qty: 84 TABLET | Refills: 3 | Status: SHIPPED | OUTPATIENT
Start: 2025-03-12

## 2025-03-23 DIAGNOSIS — E88.819 INSULIN RESISTANCE: ICD-10-CM

## 2025-03-23 DIAGNOSIS — F32.A DEPRESSION, UNSPECIFIED DEPRESSION TYPE: ICD-10-CM

## 2025-03-24 NOTE — TELEPHONE ENCOUNTER
BUSPAR  LRF UNKNOWN DATE   LOV 2/19/2025  F/U 8/21/2025    KEPPRA  LRF 1/16/2024  LOV 2/19/2025  F/U 8/21/2025    METFORMIN  LRF 2/19/2025  LOV 2/19/2025  F/U 8/21/2025    REXULTI  LRF 2/19/2025  LOV 2/19/2025  F/U 8/21/2025    VITAMIN D3  LRF 2/11/2025  LOV 2/19/2025  F/U 8/21/2025

## 2025-03-25 RX ORDER — METFORMIN HYDROCHLORIDE 500 MG/1
500 TABLET, EXTENDED RELEASE ORAL
Qty: 30 TABLET | Refills: 0 | Status: SHIPPED | OUTPATIENT
Start: 2025-03-25

## 2025-03-25 RX ORDER — LEVETIRACETAM 250 MG/1
250 TABLET ORAL 2 TIMES DAILY
Qty: 60 TABLET | Refills: 11 | Status: SHIPPED | OUTPATIENT
Start: 2025-03-25 | End: 2026-03-25

## 2025-03-25 RX ORDER — BREXPIPRAZOLE 1 MG/1
2 TABLET ORAL DAILY
Qty: 60 TABLET | Refills: 1 | Status: SHIPPED | OUTPATIENT
Start: 2025-03-25

## 2025-03-25 RX ORDER — BUSPIRONE HYDROCHLORIDE 5 MG/1
5 TABLET ORAL 3 TIMES DAILY PRN
Qty: 90 TABLET | Refills: 1 | Status: SHIPPED | OUTPATIENT
Start: 2025-03-25

## 2025-04-07 DIAGNOSIS — F32.A DEPRESSION, UNSPECIFIED DEPRESSION TYPE: ICD-10-CM

## 2025-04-08 RX ORDER — BREXPIPRAZOLE 1 MG/1
2 TABLET ORAL DAILY
Qty: 60 TABLET | Refills: 1 | Status: SHIPPED | OUTPATIENT
Start: 2025-04-08

## 2025-04-15 ENCOUNTER — OFFICE VISIT (OUTPATIENT)
Dept: BEHAVIORAL HEALTH | Facility: CLINIC | Age: 25
End: 2025-04-15
Payer: COMMERCIAL

## 2025-04-15 VITALS
WEIGHT: 225 LBS | BODY MASS INDEX: 41.41 KG/M2 | HEIGHT: 62 IN | SYSTOLIC BLOOD PRESSURE: 126 MMHG | DIASTOLIC BLOOD PRESSURE: 76 MMHG | HEART RATE: 91 BPM

## 2025-04-15 DIAGNOSIS — F33.1 MODERATE EPISODE OF RECURRENT MAJOR DEPRESSIVE DISORDER: ICD-10-CM

## 2025-04-15 DIAGNOSIS — F41.1 GENERALIZED ANXIETY DISORDER: Primary | ICD-10-CM

## 2025-04-15 DIAGNOSIS — F41.1 GENERALIZED ANXIETY DISORDER: ICD-10-CM

## 2025-04-15 DIAGNOSIS — F41.0 PANIC ATTACKS: ICD-10-CM

## 2025-04-15 RX ORDER — PROPRANOLOL HYDROCHLORIDE 10 MG/1
10 TABLET ORAL 2 TIMES DAILY PRN
Qty: 60 TABLET | Refills: 1 | Status: SHIPPED | OUTPATIENT
Start: 2025-04-15

## 2025-04-15 RX ORDER — FLUOXETINE 10 MG/1
CAPSULE ORAL
Qty: 53 CAPSULE | Refills: 1 | Status: SHIPPED | OUTPATIENT
Start: 2025-04-15

## 2025-04-15 RX ORDER — PROPRANOLOL HYDROCHLORIDE 10 MG/1
TABLET ORAL
Qty: 180 TABLET | OUTPATIENT
Start: 2025-04-15

## 2025-04-15 NOTE — PATIENT INSTRUCTIONS
1.  Please return to clinic at your next scheduled visit.  Please contact the clinic ([phone]) at least 24 hours prior in the event you need to cancel.  2.  Do no harm to yourself or others.    3.  Avoid alcohol and drugs.    4.  Take all medications as prescribed.  Please contact the clinic with any concerns. If you are in need of medication refills, please call the clinic at [phone].    5. Should you want to get in touch with your provider, ARON Blmu, please contact the office ([phone]), and staff will be able to page Kay directly.  6. In the event you have personal crisis, contact the following crisis numbers: Suicide Prevention Hotline 1-897.703.3672; [facility] Helpline 4-938-829-EFJD; [facility] [phone]; text HELLO to 538020; or 408.     SPECIFIC RECOMMENDATIONS:     1.      Medications discussed at this encounter:     New Medications Ordered This Visit   Medications    FLUoxetine (PROzac) 10 MG capsule     Sig: Start prozac 10 mg daily for 7 days, then increase to 20 mg daily     Dispense:  53 capsule     Refill:  1    propranolol (INDERAL) 10 MG tablet     Sig: Take 1 tablet by mouth 2 (Two) Times a Day As Needed (Anxiety Panic Attacks).     Dispense:  60 tablet     Refill:  1                       2.      Psychotherapy recommendations: We will continue therapy at future visits.  Please have patient go to [email or web address] and enter city or ZIP Code to find a local therapist.  They may also filter for insurance coverage, specialties, types of therapy, and choose either in person/telehealth.  Patient will need to contact the therapist to schedule an appointment.  If a referral is necessary please have patient contact the office so that we may provide the referral.       3.     Return to clinic: Return in about 2 months (around 6/15/2025).

## 2025-04-15 NOTE — PROGRESS NOTES
"[facility]  Initial Psychiatric Evaluation    Referring Provider:   Thank you   Lucy Cecelia, APRN  [address] [address]  [place] [address]  Your referral is greatly appreciated.    Vital Signs:   /76   Pulse 91   Ht 157.5 cm (62.01\")   Wt 102 kg (225 lb)   BMI 41.14 kg/m²      Chief Complaint: Panic Attacks. Anxiety.     History of Present Illness:   Asha Araujo is a 24 y.o. female who presents today for initial psychiatric evaluation for:     ICD-10-CM ICD-9-CM   1. Generalized anxiety disorder  F41.1 300.02   2. Panic attacks  F41.0 300.01   3. Moderate episode of recurrent major depressive disorder  F33.1 296.32       04/15/2025   Generalized Anxiety Disorder (AMIE)   Patient experiences excessive anxiety and worry (apprehensive expectation), occurring more days than not for at least 6 months, about a number of events or activities (such as work or school performance). Finds it difficult to control the worry. The anxiety and worry are associated with restlessness or feeling keyed up or on edge, being easily fatigued, difficulty concentrating or mind going blank, irritability, muscle tension, and sleep disturbance (difficulty falling or staying asleep, or restless, unsatisfying sleep). The anxiety, worry, or physical symptoms cause clinically significant distress or impairment in social, occupational, or other important areas of functioning.   AMIE-7 is 20.  Depression  \"All I want to do all day is sit in the bed.\" , 8 years, just started entertaining polyamorous relationship. Patient endorses gradually worsening feelings of sadness, low mood, and loss of interest in usual activities. These feelings are accompanied by anhedonia, change in appetite, losing or gaining weight, sleeping too much or not sleeping well (insomnia), fatigue and low energy most days, feeling worthless, guilty, and hopeless. Describes an inability to focus and concentrate that may " interfere with daily tasks at home, work, or school. Movements that are unusually slow or agitated (a change which is often noticeable to others). Denies thinking about death and dying, suicidal ideation, planning, or intent to self-harm. These symptoms cause the patient clinically significant distress or impairment in social, occupational, or other important areas of functioning.  PHQ-9 is 21.  Panic Attack  The current episode started more than 1 year ago. The problem occurs every couple weeks. The problem has been worsening. Associated symptoms include sense of impending doom, unbearable foreboding that something terrible is going to happen, intense fear of losing control, palpitations, racing/pounding heartbeat, chest discomfort, shortness of breath, choking sensation, detachment, depersonalization, dissociation, excessive perspiration, and derealization.        Per Referring Provider 1/23/2025 Patient is a 24-year-old female who presents today to establish care.  Previous PCP was ARON Rojas. She is due annual physical exam, to be done in office today.  Needs chronic condition management of anxiety and depression. She is on Celexa, Rexulti and Buspar. She feels stable on these at this time. She would like to be referred to psych to for potential therapy.      She has been tested for food allergist by previous PCP. They came back positive but she is not sure what for.She was referred to allergist but they id not take her insurance at the time. She would like a new referral to go over her allergies. She has been having diarrhea and ABD pain after eating for a couple of years now. Denies any family hx of celiac. She notices raw fruits and veggies really trigger her.      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      PAP- 7/18/2023- Maritza Hopkins. JAGUAR is filling her birth control and had prescribed Metformin for Insulin resistance, but the pt states that it bother her  GI tract. She said that she has not been taking. She was RX immediate release 850, never tried XR form. She repots if she goes extended period of time without eating  she has weakness, sweating, all signs of hypoglycemia.      TDAP- today     HPV- Pt believes that she has had HPV vaccine and will check previous records.      Patient is due labs. Orders placed at today's visit. Discussed with patient that these are fasting labs.      No other concerns or complaints at this time.  Patient denies any constipation, blood in stool, urinary urgency, frequency, or dysuria, chest pain, shortness of breath or syncop    Past Psychiatric History:  Began Treatment: High school  Diagnoses: Depression, Anxiety  Psychiatrist: Denies  Therapist: Yes in high school  Admission History: [facility] in high school  Medication Trials: celexa, rexulti, buspar, hydroxyzine  Family history suicide attempts: Denies  Family history suicide completions: Denies  Suicide Attempts: Denies  Self Harm: Hx of cutting in high school, bilateral upper thighs and left forearm  Substance use/abuse: Smokes marijuana  Withdrawal Symptoms: Not applicable  Longest Period Sober: Not applicable  AA: Not applicable  Trauma/Childhood/ACE: **  Access to Firearms: Denies    Safety/Risk Assessment: Risk of self-harm acutely and chronically is mild to moderate.    Static/Dynamic risk factors include diagnosis of mental disorder, psychosocial stressors,Previous self-harm, Recent stressor or loss, and Social factors.    Record Review for 04/15/2025 : I have thoroughly reviewed the patient's electronic medical record to include previous encounters, care everywhere, notes, medications, labs, CAROLYN and UDS, imaging, and EKG's.  Pertinent information is included in this note.  1/23/2025 TSH is 3.760, free T40.88, CBC and CMP are reassuring  EKG Results:  No current or pertinent labs in record  Head Imaging:  No current or pertinent labs in record    MENTAL STATUS EXAM    General Appearance:  Cleanly groomed and dressed and well developed  Eye Contact:  Good eye contact  Attitude:  Cooperative and polite  Motor Activity:  Normal gait, posture  Speech:  Normal rate, tone, volume  Mood and affect:  Normal, pleasant and euthymic  Hopelessness:  Denies  Thought Process:  Logical and goal-directed  Associations/ Thought Content:  No delusions  Hallucinations:  None  Suicidal Ideations:  Not present  Homicidal Ideation:  Not present  Sensorium:  Alert  Orientation:  Person, place, time and situation  Immediate Recall, Recent, and Remote Memory:  Intact  Attention Span/ Concentration:  Good  Fund of Knowledge:  Appropriate for age and educational level  Intellectual Functioning:  Average range  Insight:  Good  Judgement:  Good  Reliability:  Good  Impulse Control:  Good     PHQ-9 Depression Screening  PHQ-9 Total Score: 20    Little interest or pleasure in doing things? Over half   Feeling down, depressed, or hopeless? Almost all   PHQ-2 Total Score 5   Trouble falling or staying asleep, or sleeping too much? Almost all   Feeling tired or having little energy? Almost all   Poor appetite or overeating? Over half   Feeling bad about yourself - or that you are a failure or have let yourself or your family down? Almost all   Trouble concentrating on things, such as reading the newspaper or watching television? Almost all   Moving or speaking so slowly that other people could have noticed? Or the opposite - being so fidgety or restless that you have been moving around a lot more than usual? Several days   Thoughts that you would be better off dead, or of hurting yourself in some way? Not at all   PHQ-9 Total Score 20   If you checked off any problems, how difficult have these problems made it for you to do your work, take care of things at home, or get along with other people? Extremely difficult       AMIE-7  Feeling nervous, anxious or on edge: Nearly every day  Not being able to stop or  control worrying: Nearly every day  Worrying too much about different things: Nearly every day  Trouble Relaxing: Nearly every day  Being so restless that it is hard to sit still: Nearly every day  Feeling afraid as if something awful might happen: Nearly every day  Becoming easily annoyed or irritable: Nearly every day  AMIE 7 Total Score: 21  If you checked any problems, how difficult have these problems made it for you to do your work, take care of things at home, or get along with other people: Extremely difficult  Review of systems is negative except for as noted in HPI.  Labs:  WBC   Date Value Ref Range Status   01/23/2025 11.38 (H) 3.40 - 10.80 10*3/mm3 Final     Platelets   Date Value Ref Range Status   01/23/2025 293 140 - 450 10*3/mm3 Final     Hemoglobin   Date Value Ref Range Status   01/23/2025 14.2 12.0 - 15.9 g/dL Final     Hematocrit   Date Value Ref Range Status   01/23/2025 41.6 34.0 - 46.6 % Final     Glucose   Date Value Ref Range Status   01/23/2025 95 65 - 99 mg/dL Final     Creatinine   Date Value Ref Range Status   01/23/2025 0.73 0.57 - 1.00 mg/dL Final     ALT (SGPT)   Date Value Ref Range Status   01/23/2025 24 1 - 33 U/L Final     AST (SGOT)   Date Value Ref Range Status   01/23/2025 16 1 - 32 U/L Final     BUN   Date Value Ref Range Status   01/23/2025 11 6 - 20 mg/dL Final     eGFR   Date Value Ref Range Status   01/23/2025 116.5 >60.0 mL/min/1.73 Final     Total Cholesterol   Date Value Ref Range Status   01/23/2025 216 (H) 0 - 200 mg/dL Final     Triglycerides   Date Value Ref Range Status   01/23/2025 222 (H) 0 - 150 mg/dL Final     HDL Cholesterol   Date Value Ref Range Status   01/23/2025 47 40 - 60 mg/dL Final     LDL Cholesterol    Date Value Ref Range Status   01/23/2025 130 (H) 0 - 100 mg/dL Final     VLDL Cholesterol   Date Value Ref Range Status   01/23/2025 39 5 - 40 mg/dL Final     LDL/HDL Ratio   Date Value Ref Range Status   01/23/2025 2.65  Final     TSH   Date Value Ref  Range Status   01/23/2025 3.760 0.270 - 4.200 uIU/mL Final     Free T4   Date Value Ref Range Status   01/23/2025 0.88 (L) 0.92 - 1.68 ng/dL Final     Last Urine Toxicity           No data to display               Imaging Results:  No Images in the past 120 days found..  Allergy:   Allergies   Allergen Reactions    Amoxicillin Other (See Comments)     Reaction happened whenever she was a baby, unsure of the reaction      Problem List:  There is no problem list on file for this patient.    Current Medications:   Current Outpatient Medications   Medication Sig Dispense Refill    busPIRone (BUSPAR) 5 MG tablet Take 1 tablet by mouth 3 (Three) Times a Day As Needed (anxiety). 90 tablet 1    citalopram (CeleXA) 40 MG tablet Take 1 tablet by mouth Daily.      dicyclomine (BENTYL) 10 MG capsule Take 1 capsule by mouth 4 (Four) Times a Day Before Meals & at Bedtime. 180 capsule 0    levETIRAcetam (KEPPRA) 250 MG tablet Take 1 tablet by mouth 2 (Two) Times a Day. 60 tablet 11    metFORMIN ER (GLUCOPHAGE-XR) 500 MG 24 hr tablet Take 1 tablet by mouth Daily With Breakfast. 30 tablet 0    Norgestimate-Eth Estradiol (Tri-Lo-Genoveva) 0.18/0.215/0.25 MG-25 MCG tablet Take 1 tablet by mouth Daily. 84 tablet 3    Rexulti 1 MG tablet Take 2 tablets by mouth Daily. 60 tablet 1    vitamin D3 125 MCG (5000 UT) capsule capsule Take 1 capsule by mouth Daily. 90 capsule 1    FLUoxetine (PROzac) 10 MG capsule Start prozac 10 mg daily for 7 days, then increase to 20 mg daily 53 capsule 1    propranolol (INDERAL) 10 MG tablet Take 1 tablet by mouth 2 (Two) Times a Day As Needed (Anxiety Panic Attacks). 60 tablet 1     No current facility-administered medications for this visit.     Discontinued Medications:  There are no discontinued medications.  Past Surgical History:  History reviewed. No pertinent surgical history.  Past Medical History:  Past Medical History:   Diagnosis Date    Anxiety     Depression      Social History     Socioeconomic  History    Marital status:    Tobacco Use    Smoking status: Never     Passive exposure: Never    Smokeless tobacco: Never   Vaping Use    Vaping status: Some Days    Start date: 4/1/2020    Substances: Flavoring    Devices: Disposable   Substance and Sexual Activity    Alcohol use: Not Currently    Drug use: Yes     Frequency: 1.0 times per week     Types: Marijuana    Sexual activity: Yes     Partners: Female, Male     Birth control/protection: None, Birth control pill     Family History   Problem Relation Age of Onset    COPD Mother     Depression Mother     Anxiety disorder Father     Diabetes Paternal Grandmother     Breast cancer Neg Hx     Ovarian cancer Neg Hx     Uterine cancer Neg Hx     Colon cancer Neg Hx     Melanoma Neg Hx     Prostate cancer Neg Hx     Deep vein thrombosis Neg Hx     Pulmonary embolism Neg Hx      Social History:  Martial Status: , 8 years, just started entertaining polyamorous relationship  Employed: MA  Kids: None  House: Lives together with  and girlfriend, 8 animals  Legal: Denies   History: Denies  Developmental History:   Born: [place]  Siblings: Oldest of younger sister and 2 younger brothers  High School: Graduate  College: MA certificate    PLAN:   Presentation meets DSM-V criteria for:  Diagnoses and all orders for this visit:    1. Generalized anxiety disorder (Primary)  -     FLUoxetine (PROzac) 10 MG capsule; Start prozac 10 mg daily for 7 days, then increase to 20 mg daily  Dispense: 53 capsule; Refill: 1  -     propranolol (INDERAL) 10 MG tablet; Take 1 tablet by mouth 2 (Two) Times a Day As Needed (Anxiety Panic Attacks).  Dispense: 60 tablet; Refill: 1    2. Panic attacks  -     propranolol (INDERAL) 10 MG tablet; Take 1 tablet by mouth 2 (Two) Times a Day As Needed (Anxiety Panic Attacks).  Dispense: 60 tablet; Refill: 1    3. Moderate episode of recurrent major depressive disorder  -     FLUoxetine (PROzac) 10 MG capsule; Start prozac 10 mg  daily for 7 days, then increase to 20 mg daily  Dispense: 53 capsule; Refill: 1       Discontinue celexa, take 20 mg daily for 7 days then stop  Start prozac 10 mg daily for 7 days, then increase to 20 mg daily  Continue rexulti 1 mg daily  Discontinue buspar  Start propranolol 10 mg  twice daily as needed for anxiety/panic attacks  Medication Education:   PROZAC (FLUOXETINE) Risks, benefits, alternatives discussed with patient including GI upset, nausea vomiting diarrhea, theoretical decrease of seizure threshold predisposing the patient to a slightly higher seizure risk, headaches, sexual dysfunction, serotonin syndrome, bleeding risk.  After discussion of these risks and benefits, the patient voiced understanding and agreed to proceed.  INDERAL (PROPANOLOL) Risks, benefits, alternatives discussed with patient including dizziness, sedation, falls, low blood pressure, low heart rate, possible exacerbation of asthma.  After discussion of these risks and benefits, the patient voiced understanding and agreed to proceed.  REXULTI (BREXPIPRAZOLE) Risks, benefits, alternatives discussed with patient including increased energy, exacerbation of irritability, weight gain, akathisia, GI upset, tardive dyskinesia, orthostatic hypotension.  Use care when operating vehicle, vessel, or machine. After discussion of these risks and benefits, the patient voiced understanding and agreed to proceed.    Medications:   New Medications Ordered This Visit   Medications    FLUoxetine (PROzac) 10 MG capsule     Sig: Start prozac 10 mg daily for 7 days, then increase to 20 mg daily     Dispense:  53 capsule     Refill:  1    propranolol (INDERAL) 10 MG tablet     Sig: Take 1 tablet by mouth 2 (Two) Times a Day As Needed (Anxiety Panic Attacks).     Dispense:  60 tablet     Refill:  1      CAROLYN reviewed.   Discussed medication options and treatment plan of prescribed medication as well as the risks, benefits, and side effects.  Patient is  agreeable to call the office with any worsening of symptoms or onset of side effects.   Patient is agreeable to call 911 or go to the nearest ER should he/she begin having SI/HI.   Patient acknowledged, is agreeable to continue with current treatment plan, and was educated on the importance of compliance with treatment and follow-up appointments.  Addressed all questions and concerns.    Psychotherapy:    Will continue therapy at future encounters.   Functional status: Serious symptoms OR any serious impairment in social, occupational, or school functioning (41-50)  Prognosis: Fair with expectation for some response to treatment  Progress: Will address progress at follow-up visits.    Follow-up: Return in about 2 months (around 6/15/2025).       This document has been electronically signed by ARON Blum  April 15, 2025 09:52 EDT    Please note that portions of this note were completed with a voice recognition program.  Copied text in this note has been reviewed and is accurate as of 04/15/25

## 2025-04-18 DIAGNOSIS — F32.A DEPRESSION, UNSPECIFIED DEPRESSION TYPE: Primary | ICD-10-CM

## 2025-04-18 RX ORDER — BREXPIPRAZOLE 2 MG/1
2 TABLET ORAL DAILY
Qty: 60 TABLET | Refills: 0 | Status: SHIPPED | OUTPATIENT
Start: 2025-04-18

## 2025-04-23 DIAGNOSIS — Z88.9 MULTIPLE ALLERGIES: Primary | ICD-10-CM

## 2025-04-23 DIAGNOSIS — R10.9 ABDOMINAL CRAMPS: ICD-10-CM

## 2025-04-27 DIAGNOSIS — E88.819 INSULIN RESISTANCE: ICD-10-CM

## 2025-04-29 RX ORDER — METFORMIN HYDROCHLORIDE 500 MG/1
500 TABLET, EXTENDED RELEASE ORAL
Qty: 90 TABLET | Refills: 1 | Status: SHIPPED | OUTPATIENT
Start: 2025-04-29

## 2025-05-04 ENCOUNTER — HOSPITAL ENCOUNTER (EMERGENCY)
Facility: HOSPITAL | Age: 25
Discharge: HOME | End: 2025-05-04
Attending: EMERGENCY MEDICINE | Admitting: EMERGENCY MEDICINE
Payer: COMMERCIAL

## 2025-05-04 ENCOUNTER — APPOINTMENT (OUTPATIENT)
Dept: ULTRASOUND IMAGING | Facility: HOSPITAL | Age: 25
End: 2025-05-04
Payer: COMMERCIAL

## 2025-05-04 VITALS
HEIGHT: 62 IN | TEMPERATURE: 98.5 F | RESPIRATION RATE: 18 BRPM | OXYGEN SATURATION: 98 % | DIASTOLIC BLOOD PRESSURE: 85 MMHG | WEIGHT: 230.82 LBS | BODY MASS INDEX: 42.48 KG/M2 | HEART RATE: 89 BPM | SYSTOLIC BLOOD PRESSURE: 133 MMHG

## 2025-05-04 DIAGNOSIS — K80.20 GALLSTONES: Primary | ICD-10-CM

## 2025-05-04 LAB
ALBUMIN SERPL-MCNC: 4 G/DL (ref 3.5–5.2)
ALBUMIN/GLOB SERPL: 1.3 G/DL
ALP SERPL-CCNC: 102 U/L (ref 39–117)
ALT SERPL W P-5'-P-CCNC: 21 U/L (ref 1–33)
ANION GAP SERPL CALCULATED.3IONS-SCNC: 13.6 MMOL/L (ref 5–15)
AST SERPL-CCNC: 19 U/L (ref 1–32)
BASOPHILS # BLD AUTO: 0.06 10*3/MM3 (ref 0–0.2)
BASOPHILS NFR BLD AUTO: 0.5 % (ref 0–1.5)
BILIRUB SERPL-MCNC: <0.2 MG/DL (ref 0–1.2)
BILIRUB UR QL STRIP: NEGATIVE
BUN SERPL-MCNC: 10 MG/DL (ref 6–20)
BUN/CREAT SERPL: 13.5 (ref 7–25)
CALCIUM SPEC-SCNC: 9 MG/DL (ref 8.6–10.5)
CHLORIDE SERPL-SCNC: 104 MMOL/L (ref 98–107)
CLARITY UR: CLEAR
CO2 SERPL-SCNC: 20.4 MMOL/L (ref 22–29)
COLOR UR: YELLOW
CREAT SERPL-MCNC: 0.74 MG/DL (ref 0.57–1)
D-LACTATE SERPL-SCNC: 1.9 MMOL/L (ref 0.5–2)
DEPRECATED RDW RBC AUTO: 44.4 FL (ref 37–54)
EGFRCR SERPLBLD CKD-EPI 2021: 114.6 ML/MIN/1.73
EOSINOPHIL # BLD AUTO: 0.4 10*3/MM3 (ref 0–0.4)
EOSINOPHIL NFR BLD AUTO: 3.3 % (ref 0.3–6.2)
ERYTHROCYTE [DISTWIDTH] IN BLOOD BY AUTOMATED COUNT: 12.9 % (ref 12.3–15.4)
GLOBULIN UR ELPH-MCNC: 3 GM/DL
GLUCOSE SERPL-MCNC: 128 MG/DL (ref 65–99)
GLUCOSE UR STRIP-MCNC: NEGATIVE MG/DL
HCG INTACT+B SERPL-ACNC: <0.5 MIU/ML
HCT VFR BLD AUTO: 42.6 % (ref 34–46.6)
HGB BLD-MCNC: 14 G/DL (ref 12–15.9)
HGB UR QL STRIP.AUTO: NEGATIVE
HOLD SPECIMEN: NORMAL
IMM GRANULOCYTES # BLD AUTO: 0.1 10*3/MM3 (ref 0–0.05)
IMM GRANULOCYTES NFR BLD AUTO: 0.8 % (ref 0–0.5)
KETONES UR QL STRIP: NEGATIVE
LEUKOCYTE ESTERASE UR QL STRIP.AUTO: NEGATIVE
LIPASE SERPL-CCNC: 36 U/L (ref 13–60)
LYMPHOCYTES # BLD AUTO: 3.05 10*3/MM3 (ref 0.7–3.1)
LYMPHOCYTES NFR BLD AUTO: 25.5 % (ref 19.6–45.3)
MCH RBC QN AUTO: 31.3 PG (ref 26.6–33)
MCHC RBC AUTO-ENTMCNC: 32.9 G/DL (ref 31.5–35.7)
MCV RBC AUTO: 95.1 FL (ref 79–97)
MONOCYTES # BLD AUTO: 0.93 10*3/MM3 (ref 0.1–0.9)
MONOCYTES NFR BLD AUTO: 7.8 % (ref 5–12)
NEUTROPHILS NFR BLD AUTO: 62.1 % (ref 42.7–76)
NEUTROPHILS NFR BLD AUTO: 7.43 10*3/MM3 (ref 1.7–7)
NITRITE UR QL STRIP: NEGATIVE
NRBC BLD AUTO-RTO: 0 /100 WBC (ref 0–0.2)
PH UR STRIP.AUTO: 5.5 [PH] (ref 5–8)
PLATELET # BLD AUTO: 293 10*3/MM3 (ref 140–450)
PMV BLD AUTO: 10.2 FL (ref 6–12)
POTASSIUM SERPL-SCNC: 4.2 MMOL/L (ref 3.5–5.2)
PROT SERPL-MCNC: 7 G/DL (ref 6–8.5)
PROT UR QL STRIP: NEGATIVE
RBC # BLD AUTO: 4.48 10*6/MM3 (ref 3.77–5.28)
SODIUM SERPL-SCNC: 138 MMOL/L (ref 136–145)
SP GR UR STRIP: 1.02 (ref 1–1.03)
UROBILINOGEN UR QL STRIP: NORMAL
WBC NRBC COR # BLD AUTO: 11.97 10*3/MM3 (ref 3.4–10.8)
WHOLE BLOOD HOLD COAG: NORMAL
WHOLE BLOOD HOLD SPECIMEN: NORMAL

## 2025-05-04 PROCEDURE — 96375 TX/PRO/DX INJ NEW DRUG ADDON: CPT

## 2025-05-04 PROCEDURE — 25010000002 DICYCLOMINE PER 20 MG: Performed by: NURSE PRACTITIONER

## 2025-05-04 PROCEDURE — 25010000002 KETOROLAC TROMETHAMINE PER 15 MG: Performed by: NURSE PRACTITIONER

## 2025-05-04 PROCEDURE — 96372 THER/PROPH/DIAG INJ SC/IM: CPT

## 2025-05-04 PROCEDURE — 80053 COMPREHEN METABOLIC PANEL: CPT | Performed by: NURSE PRACTITIONER

## 2025-05-04 PROCEDURE — 85025 COMPLETE CBC W/AUTO DIFF WBC: CPT | Performed by: NURSE PRACTITIONER

## 2025-05-04 PROCEDURE — 81003 URINALYSIS AUTO W/O SCOPE: CPT | Performed by: NURSE PRACTITIONER

## 2025-05-04 PROCEDURE — 83690 ASSAY OF LIPASE: CPT | Performed by: NURSE PRACTITIONER

## 2025-05-04 PROCEDURE — 83605 ASSAY OF LACTIC ACID: CPT | Performed by: EMERGENCY MEDICINE

## 2025-05-04 PROCEDURE — 76705 ECHO EXAM OF ABDOMEN: CPT

## 2025-05-04 PROCEDURE — 96374 THER/PROPH/DIAG INJ IV PUSH: CPT

## 2025-05-04 PROCEDURE — 84702 CHORIONIC GONADOTROPIN TEST: CPT | Performed by: NURSE PRACTITIONER

## 2025-05-04 PROCEDURE — 99284 EMERGENCY DEPT VISIT MOD MDM: CPT

## 2025-05-04 PROCEDURE — 25010000002 ONDANSETRON PER 1 MG: Performed by: NURSE PRACTITIONER

## 2025-05-04 RX ORDER — KETOROLAC TROMETHAMINE 30 MG/ML
30 INJECTION, SOLUTION INTRAMUSCULAR; INTRAVENOUS ONCE
Status: COMPLETED | OUTPATIENT
Start: 2025-05-04 | End: 2025-05-04

## 2025-05-04 RX ORDER — MONTELUKAST SODIUM 10 MG/1
10 TABLET ORAL
COMMUNITY
Start: 2025-04-17

## 2025-05-04 RX ORDER — SODIUM CHLORIDE 0.9 % (FLUSH) 0.9 %
10 SYRINGE (ML) INJECTION AS NEEDED
Status: DISCONTINUED | OUTPATIENT
Start: 2025-05-04 | End: 2025-05-04 | Stop reason: HOSPADM

## 2025-05-04 RX ORDER — DICYCLOMINE HCL 20 MG
20 TABLET ORAL EVERY 6 HOURS PRN
Qty: 30 TABLET | Refills: 0 | Status: SHIPPED | OUTPATIENT
Start: 2025-05-04

## 2025-05-04 RX ORDER — HYDROXYZINE HYDROCHLORIDE 10 MG/1
10 TABLET, FILM COATED ORAL 3 TIMES DAILY PRN
COMMUNITY

## 2025-05-04 RX ORDER — ONDANSETRON 4 MG/1
4 TABLET, ORALLY DISINTEGRATING ORAL EVERY 8 HOURS PRN
Qty: 10 TABLET | Refills: 0 | Status: SHIPPED | OUTPATIENT
Start: 2025-05-04

## 2025-05-04 RX ORDER — KETOROLAC TROMETHAMINE 10 MG/1
10 TABLET, FILM COATED ORAL EVERY 6 HOURS PRN
Qty: 20 TABLET | Refills: 0 | Status: SHIPPED | OUTPATIENT
Start: 2025-05-04

## 2025-05-04 RX ORDER — ONDANSETRON 2 MG/ML
4 INJECTION INTRAMUSCULAR; INTRAVENOUS ONCE
Status: COMPLETED | OUTPATIENT
Start: 2025-05-04 | End: 2025-05-04

## 2025-05-04 RX ORDER — DICYCLOMINE HYDROCHLORIDE 10 MG/ML
20 INJECTION INTRAMUSCULAR ONCE
Status: COMPLETED | OUTPATIENT
Start: 2025-05-04 | End: 2025-05-04

## 2025-05-04 RX ADMIN — DICYCLOMINE HYDROCHLORIDE 20 MG: 10 INJECTION, SOLUTION INTRAMUSCULAR at 03:14

## 2025-05-04 RX ADMIN — KETOROLAC TROMETHAMINE 30 MG: 30 INJECTION, SOLUTION INTRAMUSCULAR; INTRAVENOUS at 03:13

## 2025-05-04 RX ADMIN — ONDANSETRON 4 MG: 2 INJECTION INTRAMUSCULAR; INTRAVENOUS at 03:13

## 2025-05-04 NOTE — ED PROVIDER NOTES
"SHARED VISIT NOTE:    Patient is 24 y.o. year old female that presents to the ED for evaluation of right upper quadrant pain.     Physical Exam    ED Course:    /79   Pulse 84   Temp 97.9 °F (36.6 °C) (Oral)   Resp 18   Ht 157.5 cm (62\")   Wt 105 kg (230 lb 13.2 oz)   SpO2 98%   BMI 42.22 kg/m²   Results for orders placed or performed during the hospital encounter of 05/04/25   Comprehensive Metabolic Panel    Collection Time: 05/04/25  3:02 AM    Specimen: Blood   Result Value Ref Range    Glucose 128 (H) 65 - 99 mg/dL    BUN 10 6 - 20 mg/dL    Creatinine 0.74 0.57 - 1.00 mg/dL    Sodium 138 136 - 145 mmol/L    Potassium 4.2 3.5 - 5.2 mmol/L    Chloride 104 98 - 107 mmol/L    CO2 20.4 (L) 22.0 - 29.0 mmol/L    Calcium 9.0 8.6 - 10.5 mg/dL    Total Protein 7.0 6.0 - 8.5 g/dL    Albumin 4.0 3.5 - 5.2 g/dL    ALT (SGPT) 21 1 - 33 U/L    AST (SGOT) 19 1 - 32 U/L    Alkaline Phosphatase 102 39 - 117 U/L    Total Bilirubin <0.2 0.0 - 1.2 mg/dL    Globulin 3.0 gm/dL    A/G Ratio 1.3 g/dL    BUN/Creatinine Ratio 13.5 7.0 - 25.0    Anion Gap 13.6 5.0 - 15.0 mmol/L    eGFR 114.6 >60.0 mL/min/1.73   Lipase    Collection Time: 05/04/25  3:02 AM    Specimen: Blood   Result Value Ref Range    Lipase 36 13 - 60 U/L   hCG, Quantitative, Pregnancy    Collection Time: 05/04/25  3:02 AM    Specimen: Blood   Result Value Ref Range    HCG Quantitative <0.50 mIU/mL   CBC Auto Differential    Collection Time: 05/04/25  3:02 AM    Specimen: Blood   Result Value Ref Range    WBC 11.97 (H) 3.40 - 10.80 10*3/mm3    RBC 4.48 3.77 - 5.28 10*6/mm3    Hemoglobin 14.0 12.0 - 15.9 g/dL    Hematocrit 42.6 34.0 - 46.6 %    MCV 95.1 79.0 - 97.0 fL    MCH 31.3 26.6 - 33.0 pg    MCHC 32.9 31.5 - 35.7 g/dL    RDW 12.9 12.3 - 15.4 %    RDW-SD 44.4 37.0 - 54.0 fl    MPV 10.2 6.0 - 12.0 fL    Platelets 293 140 - 450 10*3/mm3    Neutrophil % 62.1 42.7 - 76.0 %    Lymphocyte % 25.5 19.6 - 45.3 %    Monocyte % 7.8 5.0 - 12.0 %    Eosinophil % 3.3 " 0.3 - 6.2 %    Basophil % 0.5 0.0 - 1.5 %    Immature Grans % 0.8 (H) 0.0 - 0.5 %    Neutrophils, Absolute 7.43 (H) 1.70 - 7.00 10*3/mm3    Lymphocytes, Absolute 3.05 0.70 - 3.10 10*3/mm3    Monocytes, Absolute 0.93 (H) 0.10 - 0.90 10*3/mm3    Eosinophils, Absolute 0.40 0.00 - 0.40 10*3/mm3    Basophils, Absolute 0.06 0.00 - 0.20 10*3/mm3    Immature Grans, Absolute 0.10 (H) 0.00 - 0.05 10*3/mm3    nRBC 0.0 0.0 - 0.2 /100 WBC   Lactic Acid, Plasma    Collection Time: 05/04/25  3:02 AM    Specimen: Blood   Result Value Ref Range    Lactate 1.9 0.5 - 2.0 mmol/L   Green Top (Gel)    Collection Time: 05/04/25  3:02 AM   Result Value Ref Range    Extra Tube Hold for add-ons.    Lavender Top    Collection Time: 05/04/25  3:02 AM   Result Value Ref Range    Extra Tube hold for add-on    Gold Top - SST    Collection Time: 05/04/25  3:02 AM   Result Value Ref Range    Extra Tube Hold for add-ons.    Light Blue Top    Collection Time: 05/04/25  3:02 AM   Result Value Ref Range    Extra Tube Hold for add-ons.    Gray Top    Collection Time: 05/04/25  3:02 AM   Result Value Ref Range    Extra Tube Hold for add-ons.    Urinalysis With Microscopic If Indicated (No Culture) - Urine, Clean Catch    Collection Time: 05/04/25  3:31 AM    Specimen: Urine, Clean Catch   Result Value Ref Range    Color, UA Yellow Yellow, Straw    Appearance, UA Clear Clear    pH, UA 5.5 5.0 - 8.0    Specific Gravity, UA 1.023 1.005 - 1.030    Glucose, UA Negative Negative    Ketones, UA Negative Negative    Bilirubin, UA Negative Negative    Blood, UA Negative Negative    Protein, UA Negative Negative    Leuk Esterase, UA Negative Negative    Nitrite, UA Negative Negative    Urobilinogen, UA 1.0 E.U./dL 0.2 - 1.0 E.U./dL     Medications   sodium chloride 0.9 % flush 10 mL (has no administration in time range)   ketorolac (TORADOL) injection 30 mg (30 mg Intravenous Given 5/4/25 0313)   ondansetron (ZOFRAN) injection 4 mg (4 mg Intravenous Given 5/4/25  0313)   dicyclomine (BENTYL) injection 20 mg (20 mg Intramuscular Given 5/4/25 0314)     US Gallbladder  Result Date: 5/4/2025  Narrative: US GALLBLADDER-  Date of exam: 5/4/2025 3:48 AM.  Indication: Right upper quadrant abd. pain.  Comparison: No comparisons available.  TECHNIQUE: A limited abdominal ultrasound examination of the right upper quadrant was performed, tailored in order to evaluate the gallbladder and the biliary tree. Two-dimensional (2D) grayscale (B-mode) images as well as color and spectral Doppler analysis are provided for review.  FINDINGS: Again, two-dimensional (2D) grayscale (B-mode) images as well as color and spectral Doppler analysis were performed. The patient was fasting as per protocol. The gallbladder is mildly distended (9.7 x 3 x 3 cm) and contains stones (which measure about 1.3 cm in greatest diameter). There is minimal, if any, thickening of the gallbladder wall. The wall thickness is 2.1 mm. No pericholecystic fluid is seen. The common bile duct diameter is 2.8 mm. No choledocholithiasis is seen. The entire extrahepatic biliary tree is not visualized. No intrahepatic biliary ductal dilatation is seen. The pancreas is not well seen, obscured by overlying bowel gas. The visualized portions of the right kidney are unremarkable. The right kidney measures 10.6 x 3.8 x 5.1 cm. The right renal volume is 107.5 mL. No right hydronephrosis is seen. There may be mild diffuse hepatic steatosis. No hepatomegaly is suspected. The craniocaudal dimension of the right lobe of the liver is 14.9 cm. No significant focal abnormality of the liver is seen. Doppler interrogation of the hepatic vasculature reveals patent vessels with normal blood flow direction. The left kidney, spleen, inferior vena cava (IVC), and abdominal aorta were not evaluated. A negative sonographic Sow's sign was reported.       Impression: Gallstones are seen without definite acute cholecystitis by ultrasound examination.  No biliary ductal dilatation is appreciated. The other findings are as discussed above.   Portions of this note were completed with a voice recognition program.  5/4/2025 4:15 AM by Jeremie Rosado MD on Workstation: [id number]        MDM:    Procedures              SHARED VISIT ATTESTATION:    This visit was performed by both myself and an APC.  I performed the substantive portion of the medical decision making. The management plan was made or approved by me, and I take responsibility for patient management.           Isidoro Hirsch MD  04:21 EDT  05/04/25         Isidoro Hirsch MD  05/04/25 0649

## 2025-05-04 NOTE — ED NOTES
Patient arrived via EMS from home c/o upper right abdominal pain. Patient is N/V no gastrointestinal history.

## 2025-05-04 NOTE — DISCHARGE INSTRUCTIONS
Take medication as prescribed for symptomatic relief.    Follow-up with general surgery.    Return for new or worsening symptoms

## 2025-05-04 NOTE — ED PROVIDER NOTES
Time: 2:34 AM EDT  Date of encounter:  5/4/2025  Independent Historian/Clinical History and Information was obtained by:   Patient    History is limited by: N/A    Chief Complaint: Abdominal pain      History of Present Illness:  Patient is a 24 y.o. year old female who presents to the emergency department for evaluation of right upper abdominal pain that woke her up from sleep.  Patient states she has sharp stabbing pain in her right upper abdomen that seems to radiate downwards.  Does not radiate up into her chest or her back.  Some nausea but no vomiting or diarrhea.  No fever.  Patient states she has never had pain like this before.  Ate Asian food prior to sleep.  Patient still has her gallbladder      Patient Care Team  Primary Care Provider: Cecelia Ayala APRN    Past Medical History:     Allergies   Allergen Reactions    Amoxicillin Other (See Comments)     Reaction happened whenever she was a baby, unsure of the reaction     Past Medical History:   Diagnosis Date    Anxiety     Depression      History reviewed. No pertinent surgical history.  Family History   Problem Relation Age of Onset    COPD Mother     Depression Mother     Anxiety disorder Father     Diabetes Paternal Grandmother     Breast cancer Neg Hx     Ovarian cancer Neg Hx     Uterine cancer Neg Hx     Colon cancer Neg Hx     Melanoma Neg Hx     Prostate cancer Neg Hx     Deep vein thrombosis Neg Hx     Pulmonary embolism Neg Hx        Home Medications:  Prior to Admission medications    Medication Sig Start Date End Date Taking? Authorizing Provider   Brexpiprazole (Rexulti) 2 MG tablet Take 1 tablet by mouth Daily. 4/18/25   Cecelia Ayala APRN   busPIRone (BUSPAR) 5 MG tablet Take 1 tablet by mouth 3 (Three) Times a Day As Needed (anxiety). 3/25/25   Cecelia Ayala APRN   citalopram (CeleXA) 40 MG tablet Take 1 tablet by mouth Daily. 8/2/23   Provider, MD Latasha   dicyclomine (BENTYL) 10 MG capsule Take 1 capsule by mouth 4 (Four)  Times a Day Before Meals & at Bedtime. 1/23/25   Cecelia Ayala APRN   FLUoxetine (PROzac) 10 MG capsule Start prozac 10 mg daily for 7 days, then increase to 20 mg daily 4/15/25   Kay Ross APRN   levETIRAcetam (KEPPRA) 250 MG tablet Take 1 tablet by mouth 2 (Two) Times a Day. 3/25/25 3/25/26  Cecelia Ayala APRN   metFORMIN ER (GLUCOPHAGE-XR) 500 MG 24 hr tablet Take 1 tablet by mouth Daily With Breakfast. 4/29/25   Cecelia Ayala APRN   Norgestimate-Eth Estradiol (Tri-Lo-Genoveva) 0.18/0.215/0.25 MG-25 MCG tablet Take 1 tablet by mouth Daily. 3/12/25   Cecelia Ayala APRN   propranolol (INDERAL) 10 MG tablet Take 1 tablet by mouth 2 (Two) Times a Day As Needed (Anxiety Panic Attacks). 4/15/25   Kay Ross APRN   vitamin D3 125 MCG (5000 UT) capsule capsule Take 1 capsule by mouth Daily. 3/25/25   Cecelia Ayala APRN        Social History:   Social History     Tobacco Use    Smoking status: Never     Passive exposure: Never    Smokeless tobacco: Never   Vaping Use    Vaping status: Some Days    Start date: 4/1/2020    Substances: Flavoring    Devices: Disposable   Substance Use Topics    Alcohol use: Not Currently    Drug use: Yes     Frequency: 1.0 times per week     Types: Marijuana         Review of Systems:  Review of Systems   Constitutional:  Negative for chills and fever.   HENT:  Negative for congestion, ear pain and sore throat.    Eyes:  Negative for pain.   Respiratory:  Negative for cough, chest tightness and shortness of breath.    Cardiovascular:  Negative for chest pain.   Gastrointestinal:  Positive for abdominal pain and nausea. Negative for diarrhea and vomiting.   Genitourinary:  Negative for dysuria, flank pain and hematuria.   Musculoskeletal:  Negative for back pain and joint swelling.   Skin:  Negative for pallor.   Neurological:  Negative for seizures and headaches.   Hematological: Negative.    Psychiatric/Behavioral: Negative.     All other systems reviewed and are  "negative.       Physical Exam:  /79   Pulse 84   Temp 97.9 °F (36.6 °C) (Oral)   Resp 18   Ht 157.5 cm (62\")   Wt 105 kg (230 lb 13.2 oz)   SpO2 98%   BMI 42.22 kg/m²     Physical Exam  Vitals and nursing note reviewed.   Constitutional:       General: She is not in acute distress.     Appearance: Normal appearance. She is obese. She is not toxic-appearing.   HENT:      Head: Normocephalic and atraumatic.      Mouth/Throat:      Mouth: Mucous membranes are moist.   Eyes:      General: No scleral icterus.  Cardiovascular:      Rate and Rhythm: Normal rate and regular rhythm.      Pulses: Normal pulses.      Heart sounds: Normal heart sounds.   Pulmonary:      Effort: Pulmonary effort is normal. No respiratory distress.      Breath sounds: Normal breath sounds.   Abdominal:      General: Bowel sounds are normal. There is no distension.      Palpations: Abdomen is soft.      Tenderness: There is abdominal tenderness in the right upper quadrant. There is no right CVA tenderness or left CVA tenderness.      Hernia: No hernia is present.   Musculoskeletal:         General: Normal range of motion.      Cervical back: Normal range of motion and neck supple.   Skin:     General: Skin is warm and dry.   Neurological:      Mental Status: She is alert and oriented to person, place, and time. Mental status is at baseline.   Psychiatric:         Mood and Affect: Mood normal.         Behavior: Behavior normal.                    Medical Decision Making:      Comorbidities that affect care:    Anxiety, depression, Obesity    External Notes reviewed:    Previous Clinic Note: Patient last seen by behavioral MD for generalized anxiety disorder and depression      The following orders were placed and all results were independently analyzed by me:  Orders Placed This Encounter   Procedures    US Gallbladder    [facility]by Draw    Comprehensive Metabolic Panel    Lipase    Urinalysis With Microscopic If Indicated (No Culture) - " Urine, Clean Catch    hCG, Quantitative, Pregnancy    CBC Auto Differential    Lactic Acid, Plasma    Ambulatory Referral to General Surgery    Insert Peripheral IV    CBC & Differential    Green Top (Gel)    Lavender Top    Gold Top - SST    Light Blue Top    Extra Tubes    Extra Tubes    Gray Top       Medications Given in the Emergency Department:  Medications   sodium chloride 0.9 % flush 10 mL (has no administration in time range)   ketorolac (TORADOL) injection 30 mg (30 mg Intravenous Given 5/4/25 0313)   ondansetron (ZOFRAN) injection 4 mg (4 mg Intravenous Given 5/4/25 0313)   dicyclomine (BENTYL) injection 20 mg (20 mg Intramuscular Given 5/4/25 0314)        ED Course:    ED Course as of 05/04/25 0423   Sun May 04, 2025   0418  Gallbladder  Gallstones without cholecystitis [DS]   0420 Patient is currently pain-free [DS]      ED Course User Index  [DS] Samia Ray APRN       Labs:    Lab Results (last 24 hours)       Procedure Component Value Units Date/Time    CBC & Differential [[id number]]  (Abnormal) Collected: 05/04/25 0302    Specimen: Blood Updated: 05/04/25 0309    Narrative:      The following orders were created for panel order CBC & Differential.  Procedure                               Abnormality         Status                     ---------                               -----------         ------                     CBC Auto Differential[[id number]]        Abnormal            Final result                 Please view results for these tests on the individual orders.    Comprehensive Metabolic Panel [[id number]]  (Abnormal) Collected: 05/04/25 0302    Specimen: Blood Updated: 05/04/25 0334     Glucose 128 mg/dL      BUN 10 mg/dL      Creatinine 0.74 mg/dL      Sodium 138 mmol/L      Potassium 4.2 mmol/L      Comment: Slight hemolysis detected by analyzer. Result may be falsely elevated.        Chloride 104 mmol/L      CO2 20.4 mmol/L      Calcium 9.0 mg/dL      Total Protein 7.0 g/dL       Albumin 4.0 g/dL      ALT (SGPT) 21 U/L      AST (SGOT) 19 U/L      Alkaline Phosphatase 102 U/L      Total Bilirubin <0.2 mg/dL      Globulin 3.0 gm/dL      A/G Ratio 1.3 g/dL      BUN/Creatinine Ratio 13.5     Anion Gap 13.6 mmol/L      eGFR 114.6 mL/min/1.73     Narrative:      GFR Categories in Chronic Kidney Disease (CKD)              GFR Category          GFR (mL/min/1.73)    Interpretation  G1                    90 or greater        Normal or high (1)  G2                    60-89                Mild decrease (1)  G3a                   45-59                Mild to moderate decrease  G3b                   30-44                Moderate to severe decrease  G4                    15-29                Severe decrease  G5                    14 or less           Kidney failure    (1)In the absence of evidence of kidney disease, neither GFR category G1 or G2 fulfill the criteria for CKD.    eGFR calculation 2021 CKD-EPI creatinine equation, which does not include race as a factor    Lipase [[id number]]  (Normal) Collected: 05/04/25 0302    Specimen: Blood Updated: 05/04/25 0334     Lipase 36 U/L     hCG, Quantitative, Pregnancy [[id number]] Collected: 05/04/25 0302    Specimen: Blood Updated: 05/04/25 0331     HCG Quantitative <0.50 mIU/mL     Narrative:      HCG Ranges by Gestational Age    Females - non-pregnant premenopausal   </= 1mIU/mL HCG  Females - postmenopausal               </= 7mIU/mL HCG    3 Weeks       5.4   -      72 mIU/mL  4 Weeks      10.2   -     708 mIU/mL  5 Weeks       217   -   8,245 mIU/mL  6 Weeks       152   -  32,177 mIU/mL  7 Weeks     4,059   - 153,767 mIU/mL  8 Weeks    31,366   - 149,094 mIU/mL  9 Weeks    59,109   - 135,901 mIU/mL  10 Weeks   44,186   - 170,409 mIU/mL  12 Weeks   27,107   - 201,615 mIU/mL  14 Weeks   24,302   -  93,646 mIU/mL  15 Weeks   12,540   -  69,747 mIU/mL  16 Weeks    8,904   -  55,332 mIU/mL  17 Weeks    8,240   -  51,793 mIU/mL  18 Weeks    9,649   -  55,271  mIU/mL      CBC Auto Differential [[id number]]  (Abnormal) Collected: 05/04/25 0302    Specimen: Blood Updated: 05/04/25 0309     WBC 11.97 10*3/mm3      RBC 4.48 10*6/mm3      Hemoglobin 14.0 g/dL      Hematocrit 42.6 %      MCV 95.1 fL      MCH 31.3 pg      MCHC 32.9 g/dL      RDW 12.9 %      RDW-SD 44.4 fl      MPV 10.2 fL      Platelets 293 10*3/mm3      Neutrophil % 62.1 %      Lymphocyte % 25.5 %      Monocyte % 7.8 %      Eosinophil % 3.3 %      Basophil % 0.5 %      Immature Grans % 0.8 %      Neutrophils, Absolute 7.43 10*3/mm3      Lymphocytes, Absolute 3.05 10*3/mm3      Monocytes, Absolute 0.93 10*3/mm3      Eosinophils, Absolute 0.40 10*3/mm3      Basophils, Absolute 0.06 10*3/mm3      Immature Grans, Absolute 0.10 10*3/mm3      nRBC 0.0 /100 WBC     Lactic Acid, Plasma [[id number]]  (Normal) Collected: 05/04/25 0302    Specimen: Blood Updated: 05/04/25 0343     Lactate 1.9 mmol/L     Urinalysis With Microscopic If Indicated (No Culture) - Urine, Clean Catch [[id number]]  (Normal) Collected: 05/04/25 0331    Specimen: Urine, Clean Catch Updated: 05/04/25 0346     Color, UA Yellow     Appearance, UA Clear     pH, UA 5.5     Specific Gravity, UA 1.023     Glucose, UA Negative     Ketones, UA Negative     Bilirubin, UA Negative     Blood, UA Negative     Protein, UA Negative     Leuk Esterase, UA Negative     Nitrite, UA Negative     Urobilinogen, UA 1.0 E.U./dL    Narrative:      Urine microscopic not indicated.             Imaging:    US Gallbladder  Result Date: 5/4/2025  US GALLBLADDER-  Date of exam: 5/4/2025 3:48 AM.  Indication: Right upper quadrant abd. pain.  Comparison: No comparisons available.  TECHNIQUE: A limited abdominal ultrasound examination of the right upper quadrant was performed, tailored in order to evaluate the gallbladder and the biliary tree. Two-dimensional (2D) grayscale (B-mode) images as well as color and spectral Doppler analysis are provided for review.  FINDINGS: Again,  two-dimensional (2D) grayscale (B-mode) images as well as color and spectral Doppler analysis were performed. The patient was fasting as per protocol. The gallbladder is mildly distended (9.7 x 3 x 3 cm) and contains stones (which measure about 1.3 cm in greatest diameter). There is minimal, if any, thickening of the gallbladder wall. The wall thickness is 2.1 mm. No pericholecystic fluid is seen. The common bile duct diameter is 2.8 mm. No choledocholithiasis is seen. The entire extrahepatic biliary tree is not visualized. No intrahepatic biliary ductal dilatation is seen. The pancreas is not well seen, obscured by overlying bowel gas. The visualized portions of the right kidney are unremarkable. The right kidney measures 10.6 x 3.8 x 5.1 cm. The right renal volume is 107.5 mL. No right hydronephrosis is seen. There may be mild diffuse hepatic steatosis. No hepatomegaly is suspected. The craniocaudal dimension of the right lobe of the liver is 14.9 cm. No significant focal abnormality of the liver is seen. Doppler interrogation of the hepatic vasculature reveals patent vessels with normal blood flow direction. The left kidney, spleen, inferior vena cava (IVC), and abdominal aorta were not evaluated. A negative sonographic Sow's sign was reported.       Gallstones are seen without definite acute cholecystitis by ultrasound examination. No biliary ductal dilatation is appreciated. The other findings are as discussed above.   Portions of this note were completed with a voice recognition program.  5/4/2025 4:15 AM by Jeremie Rosado MD on Workstation: [id number]          Differential Diagnosis and Discussion:    Abdominal Pain: Based on the patient's signs and symptoms, I considered abdominal aortic aneurysm, small bowel obstruction, pancreatitis, acute cholecystitis, acute appendecitis, peptic ulcer disease, gastritis, colitis, endocrine disorders, irritable bowel syndrome and other differential diagnosis an etiology of  the patient's abdominal pain.    PROCEDURES:    Labs were collected in the emergency department and all labs were reviewed and interpreted by me.  Ultrasound was performed in the emergency department and the ultrasound impression was interpreted by me.     No orders to display       Procedures    MDM  Number of Diagnoses or Management Options  Diagnosis management comments: The patient is resting comfortably and feels better, is alert and in no distress. Repeat examination is unremarkable and benign; in particular, there's no discomfort at McBurney's point and there is no pulsatile mass. The history, exam, diagnostic testing, and current condition does not suggest acute appendicitis, bowel obstruction, acute cholecystitis, bowel perforation, major gastrointestinal bleeding, severe diverticulitis, abdominal aortic aneurysm, mesenteric ischemia, volvulus, sepsis, or other significant pathology that warrants further testing, continued ED treatment, admission, for surgical evaluation at this point. The vital signs have been stable. The patient does not have uncontrollable pain, intractable vomiting, or other significant symptoms. The patient's condition is stable and appropriate for discharge from the emergency department.       Amount and/or Complexity of Data Reviewed  Clinical lab tests: reviewed and ordered  Tests in the radiology section of CPT®: reviewed and ordered  Tests in the medicine section of CPT®: ordered and reviewed    Risk of Complications, Morbidity, and/or Mortality  Presenting problems: moderate  Diagnostic procedures: low  Management options: low    Patient Progress  Patient progress: stable             Patient Care Considerations:    CONSULT: I considered consulting general surgery, however patient has no acute obstruction and no acute cholecystitis and is pain-free after medication ANTIBIOTICS: I considered prescribing antibiotics as an outpatient however no bacterial focus of infection was  found.      Consultants/Shared Management Plan:    SHARED VISIT: I have discussed the case with my supervising physician, Dr. Hirsch who states agreement with treatment plan and discharge. The substantive portion of the medical decision was made by the attesting physician who made or approve the management plan and will take responsibility for the patient.  Clinical findings were discussed and ultimate disposition was made in consult with supervising physician.    Social Determinants of Health:    Patient is independent, reliable, and has access to care.       Disposition and Care Coordination:    Discharged: I considered escalation of care by admitting this patient to the hospital, however patient's lab work did not show any acute findings and there is no need for immediate surgical intervention    I have explained the patient´s condition, diagnoses and treatment plan based on the information available to me at this time. I have answered questions and addressed any concerns. The patient has a good  understanding of the patient´s diagnosis, condition, and treatment plan as can be expected at this point. The vital signs have been stable. The patient´s condition is stable and appropriate for discharge from the emergency department.      The patient will pursue further outpatient evaluation with the primary care physician or other designated or consulting physician as outlined in the discharge instructions. They are agreeable to this plan of care and follow-up instructions have been explained in detail. The patient has received these instructions in written format and has expressed an understanding of the discharge instructions. The patient is aware that any significant change in condition or worsening of symptoms should prompt an immediate return to this or the closest emergency department or call to 911.  I have explained discharge medications and the need for follow up with the patient/caretakers. This was also  printed in the discharge instructions. Patient was discharged with the following medications and follow up:      Medication List        New Prescriptions      dicyclomine 20 MG tablet  Commonly known as: BENTYL  Take 1 tablet by mouth Every 6 (Six) Hours As Needed for Abdominal Cramping.  Replaces: dicyclomine 10 MG capsule     ketorolac 10 MG tablet  Commonly known as: TORADOL  Take 1 tablet by mouth Every 6 (Six) Hours As Needed for Mild Pain, Moderate Pain or Severe Pain.     ondansetron ODT 4 MG disintegrating tablet  Commonly known as: ZOFRAN-ODT  Place 1 tablet on the tongue Every 8 (Eight) Hours As Needed for Vomiting or Nausea.            Stop      dicyclomine 10 MG capsule  Commonly known as: BENTYL  Replaced by: dicyclomine 20 MG tablet               Where to Get Your Medications        These medications were sent to [facility]/pharmacy #[id number] - [place], [place] - [address] - [phone]  - [phone] FX  [address] [place] [address]      Hours: 24-hours Phone: [phone]   dicyclomine 20 MG tablet  ketorolac 10 MG tablet  ondansetron ODT 4 MG disintegrating tablet      No follow-up provider specified.     Final diagnoses:   Gallstones        ED Disposition       ED Disposition   Discharge    Condition   Stable    Comment   --               This medical record created using voice recognition software.             Samia Ray, ARON  05/04/25 0424

## 2025-05-05 ENCOUNTER — LAB (OUTPATIENT)
Dept: LAB | Facility: HOSPITAL | Age: 25
End: 2025-05-05
Payer: COMMERCIAL

## 2025-05-05 DIAGNOSIS — Z88.9 MULTIPLE ALLERGIES: ICD-10-CM

## 2025-05-05 DIAGNOSIS — R10.9 ABDOMINAL CRAMPS: ICD-10-CM

## 2025-05-05 PROCEDURE — 86003 ALLG SPEC IGE CRUDE XTRC EA: CPT

## 2025-05-05 PROCEDURE — 36415 COLL VENOUS BLD VENIPUNCTURE: CPT

## 2025-05-10 LAB
CLAM IGE QN: <0.1 KU/L
CODFISH IGE QN: <0.1 KU/L
CONV CLASS DESCRIPTION: ABNORMAL
CORN IGE QN: 0.13 KU/L
COW MILK IGE QN: 0.2 KU/L
EGG WHITE IGE QN: 0.62 KU/L
PEANUT IGE QN: 0.19 KU/L
SCALLOP IGE QN: <0.1 KU/L
SESAME SEED IGE QN: <0.1 KU/L
SHRIMP IGE QN: <0.1 KU/L
SOYBEAN IGE QN: <0.1 KU/L
WALNUT IGE QN: <0.1 KU/L
WHEAT IGE QN: 0.25 KU/L

## 2025-05-13 ENCOUNTER — OFFICE VISIT (OUTPATIENT)
Dept: FAMILY MEDICINE CLINIC | Facility: CLINIC | Age: 25
End: 2025-05-13
Payer: COMMERCIAL

## 2025-05-13 VITALS
HEART RATE: 98 BPM | OXYGEN SATURATION: 98 % | SYSTOLIC BLOOD PRESSURE: 112 MMHG | DIASTOLIC BLOOD PRESSURE: 71 MMHG | HEIGHT: 62 IN | BODY MASS INDEX: 41.7 KG/M2 | WEIGHT: 226.6 LBS

## 2025-05-13 DIAGNOSIS — Z23 NEED FOR HPV VACCINATION: ICD-10-CM

## 2025-05-13 DIAGNOSIS — J30.9 ALLERGIC RHINITIS, UNSPECIFIED SEASONALITY, UNSPECIFIED TRIGGER: Primary | ICD-10-CM

## 2025-05-13 DIAGNOSIS — E28.2 PCOS (POLYCYSTIC OVARIAN SYNDROME): ICD-10-CM

## 2025-05-13 DIAGNOSIS — Z30.41 ORAL CONTRACEPTIVE PILL SURVEILLANCE: ICD-10-CM

## 2025-05-13 DIAGNOSIS — R09.81 NASAL CONGESTION: ICD-10-CM

## 2025-05-13 RX ORDER — FEXOFENADINE HCL 180 MG/1
180 TABLET ORAL DAILY
COMMUNITY

## 2025-05-13 RX ORDER — ALBUTEROL SULFATE AND BUDESONIDE 90; 80 UG/1; UG/1
AEROSOL, METERED RESPIRATORY (INHALATION)
COMMUNITY
Start: 2025-04-18

## 2025-05-13 RX ORDER — NORETHINDRONE ACETATE AND ETHINYL ESTRADIOL, AND FERROUS FUMARATE 1MG-20(24)
1 KIT ORAL DAILY
Qty: 28 TABLET | Refills: 12 | Status: SHIPPED | OUTPATIENT
Start: 2025-05-13 | End: 2026-05-13

## 2025-05-13 RX ORDER — PSEUDOEPHEDRINE HCL 120 MG/1
120 TABLET, FILM COATED, EXTENDED RELEASE ORAL EVERY 12 HOURS
Qty: 10 TABLET | Refills: 0 | Status: SHIPPED | OUTPATIENT
Start: 2025-05-13 | End: 2025-05-18

## 2025-05-13 NOTE — PROGRESS NOTES
Chief Complaint  Contraception and Sinus Problem    SUBJECTIVE  Asha Araujo presents to [facility]    History of Present Illness  Patient is 24-year-old female who presents today inquiring about changing birth control. She has been having trouble losing weight and feels maybe it is related to her PCOS and insulin resistance and wants to see if changing HUI will make any difference. She has been making diet changes.     She also c/o sinus pressure, congestion, sore throat and ear pain since Sunday. She does have allergies and sees an allergist. She is due to start allergy injections next month. She is taking allegra and Singulair at home. She was given Flonase but states she has not been using due to the taste, explained if she is tasting it she is not using correctly. No fever, chills, body aches, cough.     She would like to receive her 1st of 3 HPV vaccines.    No other concerns today.         Past Medical History:   Diagnosis Date    Anxiety     Depression       Family History   Problem Relation Age of Onset    COPD Mother     Depression Mother     Anxiety disorder Father     Diabetes Paternal Grandmother     Breast cancer Neg Hx     Ovarian cancer Neg Hx     Uterine cancer Neg Hx     Colon cancer Neg Hx     Melanoma Neg Hx     Prostate cancer Neg Hx     Deep vein thrombosis Neg Hx     Pulmonary embolism Neg Hx       History reviewed. No pertinent surgical history.     Current Outpatient Medications:     Airsupra 90-80 MCG/ACT aerosol, Prn, Disp: , Rfl:     Brexpiprazole (Rexulti) 2 MG tablet, Take 1 tablet by mouth Daily., Disp: 60 tablet, Rfl: 0    busPIRone (BUSPAR) 5 MG tablet, Take 1 tablet by mouth 3 (Three) Times a Day As Needed (anxiety)., Disp: 90 tablet, Rfl: 1    dicyclomine (BENTYL) 20 MG tablet, Take 1 tablet by mouth Every 6 (Six) Hours As Needed for Abdominal Cramping., Disp: 30 tablet, Rfl: 0    fexofenadine (ALLEGRA) 180 MG tablet, Take 1 tablet by mouth  "Daily., Disp: , Rfl:     FLUoxetine (PROzac) 10 MG capsule, Start prozac 10 mg daily for 7 days, then increase to 20 mg daily, Disp: 53 capsule, Rfl: 1    hydrOXYzine (ATARAX) 10 MG tablet, Take 1 tablet by mouth 3 (Three) Times a Day As Needed for Itching., Disp: , Rfl:     levETIRAcetam (KEPPRA) 250 MG tablet, Take 1 tablet by mouth 2 (Two) Times a Day., Disp: 60 tablet, Rfl: 11    metFORMIN ER (GLUCOPHAGE-XR) 500 MG 24 hr tablet, Take 1 tablet by mouth Daily With Breakfast., Disp: 90 tablet, Rfl: 1    montelukast (SINGULAIR) 10 MG tablet, Take 1 tablet by mouth every night at bedtime., Disp: , Rfl:     propranolol (INDERAL) 10 MG tablet, Take 1 tablet by mouth 2 (Two) Times a Day As Needed (Anxiety Panic Attacks)., Disp: 60 tablet, Rfl: 1    vitamin D3 125 MCG (5000 UT) capsule capsule, Take 1 capsule by mouth Daily., Disp: 90 capsule, Rfl: 1    norethindrone-ethinyl estradiol-ferrous fumarate (Ying 24 Fe) 1-20 MG-MCG(24) per tablet, Take 1 tablet by mouth Daily., Disp: 28 tablet, Rfl: 12    pseudoephedrine (SUDAFED) 120 MG 12 hr tablet, Take 1 tablet by mouth Every 12 (Twelve) Hours for 5 days., Disp: 10 tablet, Rfl: 0    OBJECTIVE  Vital Signs:   /71 (BP Location: Left arm, Patient Position: Sitting, Cuff Size: Large Adult)   Pulse 98   Ht 157.5 cm (62.01\")   Wt 103 kg (226 lb 9.6 oz)   LMP 05/08/2025   SpO2 98%   BMI 41.43 kg/m²    Estimated body mass index is 41.43 kg/m² as calculated from the following:    Height as of this encounter: 157.5 cm (62.01\").    Weight as of this encounter: 103 kg (226 lb 9.6 oz).     Wt Readings from Last 3 Encounters:   05/13/25 103 kg (226 lb 9.6 oz)   05/04/25 105 kg (230 lb 13.2 oz)   04/15/25 102 kg (225 lb)     BP Readings from Last 3 Encounters:   05/13/25 112/71   05/04/25 133/85   04/15/25 126/76       Physical Exam  Vitals reviewed.   Constitutional:       General: She is not in acute distress.     Appearance: She is not ill-appearing.   HENT:      Head: " Normocephalic and atraumatic.      Right Ear: A middle ear effusion is present.      Left Ear: A middle ear effusion is present.      Nose: Congestion and rhinorrhea present.      Right Sinus: Maxillary sinus tenderness present.      Left Sinus: Maxillary sinus tenderness present.   Eyes:      Conjunctiva/sclera: Conjunctivae normal.   Cardiovascular:      Rate and Rhythm: Normal rate and regular rhythm.      Heart sounds: Normal heart sounds.   Pulmonary:      Effort: Pulmonary effort is normal.      Breath sounds: Normal breath sounds.   Musculoskeletal:      Cervical back: Normal range of motion.   Neurological:      Mental Status: She is alert and oriented to person, place, and time.   Psychiatric:         Mood and Affect: Mood normal.         Behavior: Behavior normal.         Thought Content: Thought content normal.         Judgment: Judgment normal.          Result Review    Common labs          1/23/2025    08:44 5/4/2025    03:02   Common Labs   Glucose 95  128    BUN 11  10    Creatinine 0.73  0.74    Sodium 138  138    Potassium 4.2  4.2    Chloride 104  104    Calcium 9.2  9.0    Albumin 3.8  4.0    Total Bilirubin <0.2  <0.2    Alkaline Phosphatase 85  102    AST (SGOT) 16  19    ALT (SGPT) 24  21    WBC 11.38  11.97    Hemoglobin 14.2  14.0    Hematocrit 41.6  42.6    Platelets 293  293    Total Cholesterol 216     Triglycerides 222     HDL Cholesterol 47     LDL Cholesterol  130         US Gallbladder  Result Date: 5/4/2025  Gallstones are seen without definite acute cholecystitis by ultrasound examination. No biliary ductal dilatation is appreciated. The other findings are as discussed above.   Portions of this note were completed with a voice recognition program.  5/4/2025 4:15 AM by Jeremie Rosado MD on Workstation: [id number]          The above data has been reviewed by ARON Jackson 05/13/2025 07:25 EDT.          Patient Care Team:  Cecelia Ayala APRN as PCP - General (Nurse Practitioner)             ASSESSMENT & PLAN    Diagnoses and all orders for this visit:    1. Allergic rhinitis, unspecified seasonality, unspecified trigger (Primary)  Comments:  continue allegra and singulair, instructed proper way to use Flonase  Orders:  -     pseudoephedrine (SUDAFED) 120 MG 12 hr tablet; Take 1 tablet by mouth Every 12 (Twelve) Hours for 5 days.  Dispense: 10 tablet; Refill: 0    2. Nasal congestion  Comments:  start sudafed for 5 days  Orders:  -     pseudoephedrine (SUDAFED) 120 MG 12 hr tablet; Take 1 tablet by mouth Every 12 (Twelve) Hours for 5 days.  Dispense: 10 tablet; Refill: 0    3. Need for HPV vaccination  -     HPV Vaccine (HPV9)    4. PCOS (polycystic ovarian syndrome)  Comments:  switch OC to Lexi Fe with slightlyl ower dose of estrogen and alternative progesterone component  Orders:  -     norethindrone-ethinyl estradiol-ferrous fumarate (Ying 24 Fe) 1-20 MG-MCG(24) per tablet; Take 1 tablet by mouth Daily.  Dispense: 28 tablet; Refill: 12    5. Oral contraceptive pill surveillance  Comments:  switch OC to Lexi Fe with slightlyl ower dose of estrogen and alternative progesterone component  Orders:  -     norethindrone-ethinyl estradiol-ferrous fumarate (Ying 24 Fe) 1-20 MG-MCG(24) per tablet; Take 1 tablet by mouth Daily.  Dispense: 28 tablet; Refill: 12         Tobacco Use: Low Risk  (5/13/2025)    Patient History     Smoking Tobacco Use: Never     Smokeless Tobacco Use: Never     Passive Exposure: Never       Follow Up     Return if symptoms worsen or fail to improve.      Patient was given instructions and counseling regarding her condition or for health maintenance advice. Please see specific information pulled into the AVS if appropriate.   I have reviewed information obtained and documented by others and I have confirmed the accuracy of this documented note.    ARON Jackson

## 2025-05-15 ENCOUNTER — PREP FOR SURGERY (OUTPATIENT)
Dept: OTHER | Facility: HOSPITAL | Age: 25
End: 2025-05-15
Payer: COMMERCIAL

## 2025-05-15 ENCOUNTER — OFFICE VISIT (OUTPATIENT)
Dept: SURGERY | Facility: CLINIC | Age: 25
End: 2025-05-15
Payer: COMMERCIAL

## 2025-05-15 VITALS
BODY MASS INDEX: 41 KG/M2 | SYSTOLIC BLOOD PRESSURE: 146 MMHG | RESPIRATION RATE: 20 BRPM | WEIGHT: 222.8 LBS | HEART RATE: 104 BPM | DIASTOLIC BLOOD PRESSURE: 93 MMHG | HEIGHT: 62 IN

## 2025-05-15 DIAGNOSIS — K80.10 CHOLECYSTITIS WITH CHOLELITHIASIS: Primary | ICD-10-CM

## 2025-05-15 DIAGNOSIS — K81.9 CHOLECYSTITIS: Primary | ICD-10-CM

## 2025-05-15 RX ORDER — SODIUM CHLORIDE 9 MG/ML
40 INJECTION, SOLUTION INTRAVENOUS AS NEEDED
Status: CANCELLED | OUTPATIENT
Start: 2025-05-15

## 2025-05-15 RX ORDER — SODIUM CHLORIDE, SODIUM LACTATE, POTASSIUM CHLORIDE, CALCIUM CHLORIDE 600; 310; 30; 20 MG/100ML; MG/100ML; MG/100ML; MG/100ML
50 INJECTION, SOLUTION INTRAVENOUS CONTINUOUS
Status: CANCELLED | OUTPATIENT
Start: 2025-05-15 | End: 2025-05-16

## 2025-05-15 RX ORDER — SODIUM CHLORIDE 0.9 % (FLUSH) 0.9 %
10 SYRINGE (ML) INJECTION EVERY 12 HOURS SCHEDULED
Status: CANCELLED | OUTPATIENT
Start: 2025-05-15

## 2025-05-15 RX ORDER — SODIUM CHLORIDE 0.9 % (FLUSH) 0.9 %
10 SYRINGE (ML) INJECTION AS NEEDED
Status: CANCELLED | OUTPATIENT
Start: 2025-05-15

## 2025-05-15 RX ORDER — INDOCYANINE GREEN AND WATER 25 MG
2.5 KIT INJECTION ONCE
Status: CANCELLED | OUTPATIENT
Start: 2025-05-15 | End: 2025-05-15

## 2025-05-15 NOTE — LETTER
May 15, 2025     ARON Jackson  [address] [address]    Patient: Asha Araujo   YOB: 2000   Date of Visit: 5/15/2025     Dear ARON Jackson:       Thank you for referring Asha Araujo to me for evaluation. Below are the relevant portions of my assessment and plan of care.    If you have questions, please do not hesitate to call me. I look forward to following Asha along with you.         Sincerely,        Vahe Mueller MD        CC: ARON Jung Matthew Bruce, MD  05/15/25 1545  Sign when Signing Visit  General Surgery/Colorectal Surgery Note    Patient Name:  Asha Araujo  YOB: 2000  [id number]    Referring Provider: Samia Ray APRN      Patient Care Team:  Cecelia Ayala APRN as PCP - General (Nurse Practitioner)    Chief complaint abdominal pain    Subjective.     History of present illness:    History of significant right upper quadrant abdominal pain awakening her in the middle of the night with associated nausea and vomiting.  She went to the emergency department.  Workup with ultrasound gallbladder 5/4/2025 with gallstones.  Normal lipase.  Normal LFTs.  She had previously eaten some fatty Chinese food prior to this.  She has been feeling better since then.  No abdominal pain.  No previous abdominal surgery.  No blood thinner use.  No weight loss.  No history of ulcer disease.  No NSAID abuse.      History:  Past Medical History:   Diagnosis Date   • Anxiety    • Depression        History reviewed. No pertinent surgical history.    Family History   Problem Relation Age of Onset   • COPD Mother    • Depression Mother    • Anxiety disorder Father    • Diabetes Paternal Grandmother    • Breast cancer Neg Hx    • Ovarian cancer Neg Hx    • Uterine cancer Neg Hx    • Colon cancer Neg Hx    • Melanoma Neg Hx    • Prostate cancer Neg Hx    • Deep vein thrombosis Neg Hx    • Pulmonary embolism Neg Hx         Social History     Tobacco Use   • Smoking status: Never     Passive exposure: Never   • Smokeless tobacco: Never   Vaping Use   • Vaping status: Some Days   • Start date: 4/1/2020   • Substances: Flavoring   • Devices: Disposable   Substance Use Topics   • Alcohol use: Not Currently   • Drug use: Yes     Frequency: 1.0 times per week     Types: Marijuana       Review of Systems  All systems were reviewed and negative except for:   Review of Systems   Constitutional: Negative for chills, fever and unexpected weight loss.   HENT: Negative for congestion, nosebleeds and voice change.    Eyes: Negative for blurred vision, double vision and discharge.   Respiratory: Negative for apnea, chest tightness and shortness of breath.    Cardiovascular: Negative for chest pain and leg swelling.   Gastrointestinal:        See HPI   Endocrine: Negative for cold intolerance and heat intolerance.   Genitourinary: Negative for dysuria, hematuria and urgency.   Musculoskeletal: Negative for back pain, joint swelling and neck pain.   Skin: Negative for color change and dry skin.   Neurological: Negative for dizziness and confusion.   Hematological: Negative for adenopathy.   Psychiatric/Behavioral: Negative for agitation and behavioral problems.     MEDS:  Prior to Admission medications    Medication Sig Start Date End Date Taking? Authorizing Provider   Airsupra 90-80 MCG/ACT aerosol Prn 4/18/25  Yes ProviderLatasha MD   Brexpiprazole (Rexulti) 2 MG tablet Take 1 tablet by mouth Daily. 4/18/25  Yes Cecelia Ayala APRN   busPIRone (BUSPAR) 5 MG tablet Take 1 tablet by mouth 3 (Three) Times a Day As Needed (anxiety). 3/25/25  Yes Cecelia Ayala APRN   dicyclomine (BENTYL) 20 MG tablet Take 1 tablet by mouth Every 6 (Six) Hours As Needed for Abdominal Cramping. 5/4/25  Yes Samia Ray APRN   fexofenadine (ALLEGRA) 180 MG tablet Take 1 tablet by mouth Daily.   Yes ProviderLatasha MD   FLUoxetine (PROzac) 10 MG  capsule Start prozac 10 mg daily for 7 days, then increase to 20 mg daily 4/15/25  Yes Kay Ross APRN   hydrOXYzine (ATARAX) 10 MG tablet Take 1 tablet by mouth 3 (Three) Times a Day As Needed for Itching.   Yes ProviderLatasha MD   levETIRAcetam (KEPPRA) 250 MG tablet Take 1 tablet by mouth 2 (Two) Times a Day. 3/25/25 3/25/26 Yes Cecelia Ayala APRN   metFORMIN ER (GLUCOPHAGE-XR) 500 MG 24 hr tablet Take 1 tablet by mouth Daily With Breakfast. 4/29/25  Yes Cecelia Ayala APRN   montelukast (SINGULAIR) 10 MG tablet Take 1 tablet by mouth every night at bedtime. 4/17/25  Yes Latasha Cevallos MD   norethindrone-ethinyl estradiol-ferrous fumarate (Ying 24 Fe) 1-20 MG-MCG(24) per tablet Take 1 tablet by mouth Daily. 5/13/25 5/13/26 Yes Cecelia Ayala APRN   propranolol (INDERAL) 10 MG tablet Take 1 tablet by mouth 2 (Two) Times a Day As Needed (Anxiety Panic Attacks). 4/15/25  Yes Kay Ross APRN   pseudoephedrine (SUDAFED) 120 MG 12 hr tablet Take 1 tablet by mouth Every 12 (Twelve) Hours for 5 days. 5/13/25 5/18/25 Yes Cecelia Ayala APRN   vitamin D3 125 MCG (5000 UT) capsule capsule Take 1 capsule by mouth Daily. 3/25/25  Yes Cecelia Ayala APRN        Allergies:  Amoxicillin    Objective    Vital Signs   Heart Rate:  [104] 104  Resp:  [20] 20  BP: (146)/(93) 146/93    Physical Exam:     General Appearance:    Alert, cooperative, in no acute distress   Head:    Normocephalic, without obvious abnormality, atraumatic   Eyes:          Conjunctivae and sclerae normal, no icterus,     Ears:    Ears appear intact with no abnormalities noted   Throat:   No oral lesions, no thrush, oral mucosa moist   Neck:   No adenopathy, supple, trachea midline, no thyromegaly   Back:     No kyphosis present, no scoliosis present, no skin lesions,      erythema or scars, no tenderness to percussion or                   palpation,   range of motion normal   Lungs:     Clear to auscultation,respirations  "regular, even and                  unlabored    Heart:    Regular rhythm and normal rate, normal S1 and S2, no            murmur, no gallop, no rub, no click   Chest Wall:    No abnormalities observed   Abdomen:     Normal bowel sounds, no masses, no organomegaly, soft        non-tender, non-distended, no guarding, no rebound                tenderness   Rectal:        Extremities:   Moves all extremities well, no edema, no cyanosis, no             redness   Pulses:   Pulses palpable and equal bilaterally   Skin:   No bleeding, bruising or rash   Lymph nodes:   No palpable adenopathy   Neurologic:   A/o x 4 with no deficits       Results Review:   {Results Review:43941::\"I reviewed the patient's new clinical results.\"    LABS/IMAGING:  Results for orders placed or performed in visit on 05/05/25   Food Allergy Profile    Collection Time: 05/05/25 12:39 PM    Specimen: Blood   Result Value Ref Range    Class Description Comment     Egg White 0.62 (A) Class II kU/L    Peanut 0.19 (A) Class 0/I kU/L    Soybean <0.10 Class 0 kU/L    Milk, Cow's 0.20 (A) Class 0/I kU/L    Clams <0.10 Class 0 kU/L    Shrimp <0.10 Class 0 kU/L    [place] <0.10 Class 0 kU/L    CodFish <0.10 Class 0 kU/L    Scallop <0.10 Class 0 kU/L    Wheat 0.25 (A) Class 0/I kU/L    Corn 0.13 (A) Class 0/I kU/L    Sesame Seed <0.10 Class 0 kU/L        Result Review: Labs  Result Review  Imaging  Med Tab  Media     Assessment & Plan    Cholecystitis without evidence of obstruction or gangrene    Discussion with patient.  I reviewed workup with results mentioned above.  We discussed observation versus cholecystectomy.  She wishes to proceed with cholecystectomy.  I recommended laparoscopic possible open cholecystectomy.  Procedure and recovery discussed.  Benefits and alternatives discussed.  Risk procedure including risk of anesthesia, bleeding, infection, conversion open, damage to the, bile duct, bile leak, failure to relieve her pain, heart attack, " stroke, blood clot, pneumonia, hernia discussed.  All questions answered.  She agrees with the plan.  She was instructed to use chlorhexidine the night before surgery.  Thank you for the consult              This document has been electronically signed by Vahe Mueller MD  May 15, 2025 15:42 EDT

## 2025-05-15 NOTE — H&P (VIEW-ONLY)
General Surgery/Colorectal Surgery Note    Patient Name:  Asha Araujo  YOB: 2000  [id number]    Referring Provider: Samia Ray APRN      Patient Care Team:  Cecelia Ayala APRN as PCP - General (Nurse Practitioner)    Chief complaint abdominal pain    Subjective .     History of present illness:    History of significant right upper quadrant abdominal pain awakening her in the middle of the night with associated nausea and vomiting.  She went to the emergency department.  Workup with ultrasound gallbladder 5/4/2025 with gallstones.  Normal lipase.  Normal LFTs.  She had previously eaten some fatty Chinese food prior to this.  She has been feeling better since then.  No abdominal pain.  No previous abdominal surgery.  No blood thinner use.  No weight loss.  No history of ulcer disease.  No NSAID abuse.      History:  Past Medical History:   Diagnosis Date    Anxiety     Depression        History reviewed. No pertinent surgical history.    Family History   Problem Relation Age of Onset    COPD Mother     Depression Mother     Anxiety disorder Father     Diabetes Paternal Grandmother     Breast cancer Neg Hx     Ovarian cancer Neg Hx     Uterine cancer Neg Hx     Colon cancer Neg Hx     Melanoma Neg Hx     Prostate cancer Neg Hx     Deep vein thrombosis Neg Hx     Pulmonary embolism Neg Hx        Social History     Tobacco Use    Smoking status: Never     Passive exposure: Never    Smokeless tobacco: Never   Vaping Use    Vaping status: Some Days    Start date: 4/1/2020    Substances: Flavoring    Devices: Disposable   Substance Use Topics    Alcohol use: Not Currently    Drug use: Yes     Frequency: 1.0 times per week     Types: Marijuana       Review of Systems  All systems were reviewed and negative except for:   Review of Systems   Constitutional: Negative for chills, fever and unexpected weight loss.   HENT: Negative for congestion, nosebleeds and voice change.    Eyes: Negative for blurred  vision, double vision and discharge.   Respiratory: Negative for apnea, chest tightness and shortness of breath.    Cardiovascular: Negative for chest pain and leg swelling.   Gastrointestinal:        See HPI   Endocrine: Negative for cold intolerance and heat intolerance.   Genitourinary: Negative for dysuria, hematuria and urgency.   Musculoskeletal: Negative for back pain, joint swelling and neck pain.   Skin: Negative for color change and dry skin.   Neurological: Negative for dizziness and confusion.   Hematological: Negative for adenopathy.   Psychiatric/Behavioral: Negative for agitation and behavioral problems.     MEDS:  Prior to Admission medications    Medication Sig Start Date End Date Taking? Authorizing Provider   Airsupra 90-80 MCG/ACT aerosol Prn 4/18/25  Yes Latasha Cevallos MD   Brexpiprazole (Rexulti) 2 MG tablet Take 1 tablet by mouth Daily. 4/18/25  Yes Cecelia Ayala APRN   busPIRone (BUSPAR) 5 MG tablet Take 1 tablet by mouth 3 (Three) Times a Day As Needed (anxiety). 3/25/25  Yes Cecelia Ayala APRN   dicyclomine (BENTYL) 20 MG tablet Take 1 tablet by mouth Every 6 (Six) Hours As Needed for Abdominal Cramping. 5/4/25  Yes Samia Ray APRN   fexofenadine (ALLEGRA) 180 MG tablet Take 1 tablet by mouth Daily.   Yes Latasha Cevallos MD   FLUoxetine (PROzac) 10 MG capsule Start prozac 10 mg daily for 7 days, then increase to 20 mg daily 4/15/25  Yes Kay Ross APRN   hydrOXYzine (ATARAX) 10 MG tablet Take 1 tablet by mouth 3 (Three) Times a Day As Needed for Itching.   Yes Latasha Cevallos MD   levETIRAcetam (KEPPRA) 250 MG tablet Take 1 tablet by mouth 2 (Two) Times a Day. 3/25/25 3/25/26 Yes Cecelia Ayala APRN   metFORMIN ER (GLUCOPHAGE-XR) 500 MG 24 hr tablet Take 1 tablet by mouth Daily With Breakfast. 4/29/25  Yes Cecelia Ayala APRN   montelukast (SINGULAIR) 10 MG tablet Take 1 tablet by mouth every night at bedtime. 4/17/25  Yes Latasha Cevallos MD    norethindrone-ethinyl estradiol-ferrous fumarate (Ying 24 Fe) 1-20 MG-MCG(24) per tablet Take 1 tablet by mouth Daily. 5/13/25 5/13/26 Yes Cecelia Ayala APRN   propranolol (INDERAL) 10 MG tablet Take 1 tablet by mouth 2 (Two) Times a Day As Needed (Anxiety Panic Attacks). 4/15/25  Yes Kay Ross APRN   pseudoephedrine (SUDAFED) 120 MG 12 hr tablet Take 1 tablet by mouth Every 12 (Twelve) Hours for 5 days. 5/13/25 5/18/25 Yes Cecelia Ayala APRN   vitamin D3 125 MCG (5000 UT) capsule capsule Take 1 capsule by mouth Daily. 3/25/25  Yes Cecelia Ayala APRN        Allergies:  Amoxicillin    Objective     Vital Signs   Heart Rate:  [104] 104  Resp:  [20] 20  BP: (146)/(93) 146/93    Physical Exam:     General Appearance:    Alert, cooperative, in no acute distress   Head:    Normocephalic, without obvious abnormality, atraumatic   Eyes:          Conjunctivae and sclerae normal, no icterus,     Ears:    Ears appear intact with no abnormalities noted   Throat:   No oral lesions, no thrush, oral mucosa moist   Neck:   No adenopathy, supple, trachea midline, no thyromegaly   Back:     No kyphosis present, no scoliosis present, no skin lesions,      erythema or scars, no tenderness to percussion or                   palpation,   range of motion normal   Lungs:     Clear to auscultation,respirations regular, even and                  unlabored    Heart:    Regular rhythm and normal rate, normal S1 and S2, no            murmur, no gallop, no rub, no click   Chest Wall:    No abnormalities observed   Abdomen:     Normal bowel sounds, no masses, no organomegaly, soft        non-tender, non-distended, no guarding, no rebound                tenderness   Rectal:        Extremities:   Moves all extremities well, no edema, no cyanosis, no             redness   Pulses:   Pulses palpable and equal bilaterally   Skin:   No bleeding, bruising or rash   Lymph nodes:   No palpable adenopathy   Neurologic:   A/o x 4 with no  "deficits       Results Review:   {Results Review:52046::\"I reviewed the patient's new clinical results.\"    LABS/IMAGING:  Results for orders placed or performed in visit on 05/05/25   Food Allergy Profile    Collection Time: 05/05/25 12:39 PM    Specimen: Blood   Result Value Ref Range    Class Description Comment     Egg White 0.62 (A) Class II kU/L    Peanut 0.19 (A) Class 0/I kU/L    Soybean <0.10 Class 0 kU/L    Milk, Cow's 0.20 (A) Class 0/I kU/L    Clams <0.10 Class 0 kU/L    Shrimp <0.10 Class 0 kU/L    [place] <0.10 Class 0 kU/L    CodFish <0.10 Class 0 kU/L    Scallop <0.10 Class 0 kU/L    Wheat 0.25 (A) Class 0/I kU/L    Corn 0.13 (A) Class 0/I kU/L    Sesame Seed <0.10 Class 0 kU/L        Result Review : Labs  Result Review  Imaging  Med Tab  Media     Assessment & Plan     Cholecystitis without evidence of obstruction or gangrene    Discussion with patient.  I reviewed workup with results mentioned above.  We discussed observation versus cholecystectomy.  She wishes to proceed with cholecystectomy.  I recommended laparoscopic possible open cholecystectomy.  Procedure and recovery discussed.  Benefits and alternatives discussed.  Risk procedure including risk of anesthesia, bleeding, infection, conversion open, damage to the, bile duct, bile leak, failure to relieve her pain, heart attack, stroke, blood clot, pneumonia, hernia discussed.  All questions answered.  She agrees with the plan.  She was instructed to use chlorhexidine the night before surgery.  Thank you for the consult              This document has been electronically signed by Vahe Mueller MD  May 15, 2025 15:42 EDT  "

## 2025-05-15 NOTE — PROGRESS NOTES
General Surgery/Colorectal Surgery Note    Patient Name:  Asha Araujo  YOB: 2000  [id number]    Referring Provider: Samia Ray APRN      Patient Care Team:  Cecelia Ayala APRN as PCP - General (Nurse Practitioner)    Chief complaint abdominal pain    Subjective .     History of present illness:    History of significant right upper quadrant abdominal pain awakening her in the middle of the night with associated nausea and vomiting.  She went to the emergency department.  Workup with ultrasound gallbladder 5/4/2025 with gallstones.  Normal lipase.  Normal LFTs.  She had previously eaten some fatty Chinese food prior to this.  She has been feeling better since then.  No abdominal pain.  No previous abdominal surgery.  No blood thinner use.  No weight loss.  No history of ulcer disease.  No NSAID abuse.      History:  Past Medical History:   Diagnosis Date    Anxiety     Depression        History reviewed. No pertinent surgical history.    Family History   Problem Relation Age of Onset    COPD Mother     Depression Mother     Anxiety disorder Father     Diabetes Paternal Grandmother     Breast cancer Neg Hx     Ovarian cancer Neg Hx     Uterine cancer Neg Hx     Colon cancer Neg Hx     Melanoma Neg Hx     Prostate cancer Neg Hx     Deep vein thrombosis Neg Hx     Pulmonary embolism Neg Hx        Social History     Tobacco Use    Smoking status: Never     Passive exposure: Never    Smokeless tobacco: Never   Vaping Use    Vaping status: Some Days    Start date: 4/1/2020    Substances: Flavoring    Devices: Disposable   Substance Use Topics    Alcohol use: Not Currently    Drug use: Yes     Frequency: 1.0 times per week     Types: Marijuana       Review of Systems  All systems were reviewed and negative except for:   Review of Systems   Constitutional: Negative for chills, fever and unexpected weight loss.   HENT: Negative for congestion, nosebleeds and voice change.    Eyes: Negative for blurred  vision, double vision and discharge.   Respiratory: Negative for apnea, chest tightness and shortness of breath.    Cardiovascular: Negative for chest pain and leg swelling.   Gastrointestinal:        See HPI   Endocrine: Negative for cold intolerance and heat intolerance.   Genitourinary: Negative for dysuria, hematuria and urgency.   Musculoskeletal: Negative for back pain, joint swelling and neck pain.   Skin: Negative for color change and dry skin.   Neurological: Negative for dizziness and confusion.   Hematological: Negative for adenopathy.   Psychiatric/Behavioral: Negative for agitation and behavioral problems.     MEDS:  Prior to Admission medications    Medication Sig Start Date End Date Taking? Authorizing Provider   Airsupra 90-80 MCG/ACT aerosol Prn 4/18/25  Yes Latasha Cevallos MD   Brexpiprazole (Rexulti) 2 MG tablet Take 1 tablet by mouth Daily. 4/18/25  Yes Cecelia Ayala APRN   busPIRone (BUSPAR) 5 MG tablet Take 1 tablet by mouth 3 (Three) Times a Day As Needed (anxiety). 3/25/25  Yes Cecelia Ayala APRN   dicyclomine (BENTYL) 20 MG tablet Take 1 tablet by mouth Every 6 (Six) Hours As Needed for Abdominal Cramping. 5/4/25  Yes Samia Ray APRN   fexofenadine (ALLEGRA) 180 MG tablet Take 1 tablet by mouth Daily.   Yes Latasha Cevallos MD   FLUoxetine (PROzac) 10 MG capsule Start prozac 10 mg daily for 7 days, then increase to 20 mg daily 4/15/25  Yes Kay Ross APRN   hydrOXYzine (ATARAX) 10 MG tablet Take 1 tablet by mouth 3 (Three) Times a Day As Needed for Itching.   Yes Latasha Cevallos MD   levETIRAcetam (KEPPRA) 250 MG tablet Take 1 tablet by mouth 2 (Two) Times a Day. 3/25/25 3/25/26 Yes Cecelia Ayala APRN   metFORMIN ER (GLUCOPHAGE-XR) 500 MG 24 hr tablet Take 1 tablet by mouth Daily With Breakfast. 4/29/25  Yes Cecelia Ayala APRN   montelukast (SINGULAIR) 10 MG tablet Take 1 tablet by mouth every night at bedtime. 4/17/25  Yes Latasha Cevallos MD    norethindrone-ethinyl estradiol-ferrous fumarate (Ying 24 Fe) 1-20 MG-MCG(24) per tablet Take 1 tablet by mouth Daily. 5/13/25 5/13/26 Yes Cecelia Ayala APRN   propranolol (INDERAL) 10 MG tablet Take 1 tablet by mouth 2 (Two) Times a Day As Needed (Anxiety Panic Attacks). 4/15/25  Yes Kay Ross APRN   pseudoephedrine (SUDAFED) 120 MG 12 hr tablet Take 1 tablet by mouth Every 12 (Twelve) Hours for 5 days. 5/13/25 5/18/25 Yes Cecelia Ayala APRN   vitamin D3 125 MCG (5000 UT) capsule capsule Take 1 capsule by mouth Daily. 3/25/25  Yes Cecelia Ayala APRN        Allergies:  Amoxicillin    Objective     Vital Signs   Heart Rate:  [104] 104  Resp:  [20] 20  BP: (146)/(93) 146/93    Physical Exam:     General Appearance:    Alert, cooperative, in no acute distress   Head:    Normocephalic, without obvious abnormality, atraumatic   Eyes:          Conjunctivae and sclerae normal, no icterus,     Ears:    Ears appear intact with no abnormalities noted   Throat:   No oral lesions, no thrush, oral mucosa moist   Neck:   No adenopathy, supple, trachea midline, no thyromegaly   Back:     No kyphosis present, no scoliosis present, no skin lesions,      erythema or scars, no tenderness to percussion or                   palpation,   range of motion normal   Lungs:     Clear to auscultation,respirations regular, even and                  unlabored    Heart:    Regular rhythm and normal rate, normal S1 and S2, no            murmur, no gallop, no rub, no click   Chest Wall:    No abnormalities observed   Abdomen:     Normal bowel sounds, no masses, no organomegaly, soft        non-tender, non-distended, no guarding, no rebound                tenderness   Rectal:        Extremities:   Moves all extremities well, no edema, no cyanosis, no             redness   Pulses:   Pulses palpable and equal bilaterally   Skin:   No bleeding, bruising or rash   Lymph nodes:   No palpable adenopathy   Neurologic:   A/o x 4 with no  "deficits       Results Review:   {Results Review:[id number]::\"I reviewed the patient's new clinical results.\"    LABS/IMAGING:  Results for orders placed or performed in visit on 05/05/25   Food Allergy Profile    Collection Time: 05/05/25 12:39 PM    Specimen: Blood   Result Value Ref Range    Class Description Comment     Egg White 0.62 (A) Class II kU/L    Peanut 0.19 (A) Class 0/I kU/L    Soybean <0.10 Class 0 kU/L    Milk, Cow's 0.20 (A) Class 0/I kU/L    Clams <0.10 Class 0 kU/L    Shrimp <0.10 Class 0 kU/L    [place] <0.10 Class 0 kU/L    CodFish <0.10 Class 0 kU/L    Scallop <0.10 Class 0 kU/L    Wheat 0.25 (A) Class 0/I kU/L    Corn 0.13 (A) Class 0/I kU/L    Sesame Seed <0.10 Class 0 kU/L        Result Review : Labs  Result Review  Imaging  Med Tab  Media     Assessment & Plan     Cholecystitis without evidence of obstruction or gangrene    Discussion with patient.  I reviewed workup with results mentioned above.  We discussed observation versus cholecystectomy.  She wishes to proceed with cholecystectomy.  I recommended laparoscopic possible open cholecystectomy.  Procedure and recovery discussed.  Benefits and alternatives discussed.  Risk procedure including risk of anesthesia, bleeding, infection, conversion open, damage to the, bile duct, bile leak, failure to relieve her pain, heart attack, stroke, blood clot, pneumonia, hernia discussed.  All questions answered.  She agrees with the plan.  She was instructed to use chlorhexidine the night before surgery.  Thank you for the consult              This document has been electronically signed by Vahe Mueller MD  May 15, 2025 15:42 EDT  "

## 2025-05-16 ENCOUNTER — ANESTHESIA EVENT (OUTPATIENT)
Dept: PERIOP | Facility: HOSPITAL | Age: 25
End: 2025-05-16
Payer: COMMERCIAL

## 2025-05-16 ENCOUNTER — ANESTHESIA (OUTPATIENT)
Dept: PERIOP | Facility: HOSPITAL | Age: 25
End: 2025-05-16
Payer: COMMERCIAL

## 2025-05-16 ENCOUNTER — HOSPITAL ENCOUNTER (OUTPATIENT)
Facility: HOSPITAL | Age: 25
Setting detail: HOSPITAL OUTPATIENT SURGERY
Discharge: HOME | End: 2025-05-16
Attending: SURGERY | Admitting: SURGERY
Payer: COMMERCIAL

## 2025-05-16 VITALS
OXYGEN SATURATION: 94 % | BODY MASS INDEX: 41.06 KG/M2 | WEIGHT: 223.11 LBS | HEART RATE: 73 BPM | SYSTOLIC BLOOD PRESSURE: 103 MMHG | DIASTOLIC BLOOD PRESSURE: 74 MMHG | TEMPERATURE: 97.6 F | RESPIRATION RATE: 16 BRPM | HEIGHT: 62 IN

## 2025-05-16 DIAGNOSIS — K80.10 CHOLECYSTITIS WITH CHOLELITHIASIS: ICD-10-CM

## 2025-05-16 DIAGNOSIS — K80.10 CALCULUS OF GALLBLADDER WITH CHRONIC CHOLECYSTITIS WITHOUT OBSTRUCTION: Primary | ICD-10-CM

## 2025-05-16 LAB — B-HCG UR QL: NEGATIVE

## 2025-05-16 PROCEDURE — 25010000002 DEXAMETHASONE SODIUM PHOSPHATE 100 MG/10ML SOLUTION: Performed by: SURGERY

## 2025-05-16 PROCEDURE — 25010000002 ONDANSETRON PER 1 MG: Performed by: NURSE ANESTHETIST, CERTIFIED REGISTERED

## 2025-05-16 PROCEDURE — 25010000002 BUPRENORPHINE PER 0.1 MG: Performed by: SURGERY

## 2025-05-16 PROCEDURE — 25010000002 MIDAZOLAM PER 1MG: Performed by: ANESTHESIOLOGY

## 2025-05-16 PROCEDURE — 25810000003 LACTATED RINGERS PER 1000 ML: Performed by: ANESTHESIOLOGY

## 2025-05-16 PROCEDURE — 81025 URINE PREGNANCY TEST: CPT | Performed by: SURGERY

## 2025-05-16 PROCEDURE — 25010000002 LIDOCAINE PF 2% 2 % SOLUTION: Performed by: NURSE ANESTHETIST, CERTIFIED REGISTERED

## 2025-05-16 PROCEDURE — 25010000002 CEFAZOLIN PER 500 MG: Performed by: SURGERY

## 2025-05-16 PROCEDURE — 25010000002 HYDROMORPHONE 1 MG/ML SOLUTION

## 2025-05-16 PROCEDURE — 25010000002 BUPIVACAINE (PF) 0.25 % SOLUTION: Performed by: SURGERY

## 2025-05-16 PROCEDURE — 47563 LAPARO CHOLECYSTECTOMY/GRAPH: CPT | Performed by: SURGERY

## 2025-05-16 PROCEDURE — 25010000002 KETOROLAC TROMETHAMINE PER 15 MG: Performed by: NURSE ANESTHETIST, CERTIFIED REGISTERED

## 2025-05-16 PROCEDURE — 25010000002 SUGAMMADEX 200 MG/2ML SOLUTION

## 2025-05-16 PROCEDURE — 47563 LAPARO CHOLECYSTECTOMY/GRAPH: CPT

## 2025-05-16 PROCEDURE — 25010000002 PROPOFOL 10 MG/ML EMULSION: Performed by: NURSE ANESTHETIST, CERTIFIED REGISTERED

## 2025-05-16 PROCEDURE — 88304 TISSUE EXAM BY PATHOLOGIST: CPT | Performed by: SURGERY

## 2025-05-16 PROCEDURE — 25010000002 FENTANYL CITRATE (PF) 50 MCG/ML SOLUTION: Performed by: NURSE ANESTHETIST, CERTIFIED REGISTERED

## 2025-05-16 PROCEDURE — 25010000002 DEXAMETHASONE PER 1 MG: Performed by: NURSE ANESTHETIST, CERTIFIED REGISTERED

## 2025-05-16 PROCEDURE — 25010000002 INDOCYANINE GREEN 25 MG RECONSTITUTED SOLUTION: Performed by: SURGERY

## 2025-05-16 RX ORDER — SODIUM CHLORIDE 9 MG/ML
40 INJECTION, SOLUTION INTRAVENOUS AS NEEDED
Status: DISCONTINUED | OUTPATIENT
Start: 2025-05-16 | End: 2025-05-16 | Stop reason: HOSPADM

## 2025-05-16 RX ORDER — FENTANYL CITRATE 50 UG/ML
INJECTION, SOLUTION INTRAMUSCULAR; INTRAVENOUS AS NEEDED
Status: DISCONTINUED | OUTPATIENT
Start: 2025-05-16 | End: 2025-05-16 | Stop reason: SURG

## 2025-05-16 RX ORDER — POLYETHYLENE GLYCOL 3350 17 G/17G
17 POWDER, FOR SOLUTION ORAL DAILY
Qty: 5 PACKET | Refills: 0 | Status: SHIPPED | OUTPATIENT
Start: 2025-05-16

## 2025-05-16 RX ORDER — SODIUM CHLORIDE 0.9 % (FLUSH) 0.9 %
10 SYRINGE (ML) INJECTION EVERY 12 HOURS SCHEDULED
Status: DISCONTINUED | OUTPATIENT
Start: 2025-05-16 | End: 2025-05-16 | Stop reason: HOSPADM

## 2025-05-16 RX ORDER — DEXAMETHASONE SODIUM PHOSPHATE 4 MG/ML
INJECTION, SOLUTION INTRA-ARTICULAR; INTRALESIONAL; INTRAMUSCULAR; INTRAVENOUS; SOFT TISSUE AS NEEDED
Status: DISCONTINUED | OUTPATIENT
Start: 2025-05-16 | End: 2025-05-16 | Stop reason: SURG

## 2025-05-16 RX ORDER — INDOCYANINE GREEN AND WATER 25 MG
2.5 KIT INJECTION ONCE
Status: COMPLETED | OUTPATIENT
Start: 2025-05-16 | End: 2025-05-16

## 2025-05-16 RX ORDER — PROMETHAZINE HYDROCHLORIDE 25 MG/1
25 SUPPOSITORY RECTAL ONCE AS NEEDED
Status: DISCONTINUED | OUTPATIENT
Start: 2025-05-16 | End: 2025-05-16 | Stop reason: HOSPADM

## 2025-05-16 RX ORDER — HYDROCODONE BITARTRATE AND ACETAMINOPHEN 5; 325 MG/1; MG/1
1 TABLET ORAL EVERY 6 HOURS PRN
Qty: 12 TABLET | Refills: 0 | Status: SHIPPED | OUTPATIENT
Start: 2025-05-16

## 2025-05-16 RX ORDER — KETOROLAC TROMETHAMINE 15 MG/ML
INJECTION, SOLUTION INTRAMUSCULAR; INTRAVENOUS AS NEEDED
Status: DISCONTINUED | OUTPATIENT
Start: 2025-05-16 | End: 2025-05-16 | Stop reason: SURG

## 2025-05-16 RX ORDER — SODIUM CHLORIDE, SODIUM LACTATE, POTASSIUM CHLORIDE, CALCIUM CHLORIDE 600; 310; 30; 20 MG/100ML; MG/100ML; MG/100ML; MG/100ML
50 INJECTION, SOLUTION INTRAVENOUS CONTINUOUS
Status: DISCONTINUED | OUTPATIENT
Start: 2025-05-16 | End: 2025-05-16 | Stop reason: HOSPADM

## 2025-05-16 RX ORDER — SODIUM CHLORIDE, SODIUM LACTATE, POTASSIUM CHLORIDE, CALCIUM CHLORIDE 600; 310; 30; 20 MG/100ML; MG/100ML; MG/100ML; MG/100ML
9 INJECTION, SOLUTION INTRAVENOUS CONTINUOUS PRN
Status: DISCONTINUED | OUTPATIENT
Start: 2025-05-16 | End: 2025-05-16 | Stop reason: HOSPADM

## 2025-05-16 RX ORDER — ACETAMINOPHEN 500 MG
1000 TABLET ORAL ONCE
Status: COMPLETED | OUTPATIENT
Start: 2025-05-16 | End: 2025-05-16

## 2025-05-16 RX ORDER — PROPOFOL 10 MG/ML
VIAL (ML) INTRAVENOUS AS NEEDED
Status: DISCONTINUED | OUTPATIENT
Start: 2025-05-16 | End: 2025-05-16 | Stop reason: SURG

## 2025-05-16 RX ORDER — MIDAZOLAM HYDROCHLORIDE 2 MG/2ML
2 INJECTION, SOLUTION INTRAMUSCULAR; INTRAVENOUS ONCE
Status: COMPLETED | OUTPATIENT
Start: 2025-05-16 | End: 2025-05-16

## 2025-05-16 RX ORDER — SCOPOLAMINE 1 MG/3D
1 PATCH, EXTENDED RELEASE TRANSDERMAL ONCE
Status: DISCONTINUED | OUTPATIENT
Start: 2025-05-16 | End: 2025-05-16 | Stop reason: HOSPADM

## 2025-05-16 RX ORDER — OXYCODONE HYDROCHLORIDE 5 MG/1
5 TABLET ORAL
Status: DISCONTINUED | OUTPATIENT
Start: 2025-05-16 | End: 2025-05-16 | Stop reason: HOSPADM

## 2025-05-16 RX ORDER — PROMETHAZINE HYDROCHLORIDE 12.5 MG/1
25 TABLET ORAL ONCE AS NEEDED
Status: DISCONTINUED | OUTPATIENT
Start: 2025-05-16 | End: 2025-05-16 | Stop reason: HOSPADM

## 2025-05-16 RX ORDER — LIDOCAINE HYDROCHLORIDE 20 MG/ML
INJECTION, SOLUTION EPIDURAL; INFILTRATION; INTRACAUDAL; PERINEURAL AS NEEDED
Status: DISCONTINUED | OUTPATIENT
Start: 2025-05-16 | End: 2025-05-16 | Stop reason: SURG

## 2025-05-16 RX ORDER — ONDANSETRON 2 MG/ML
INJECTION INTRAMUSCULAR; INTRAVENOUS AS NEEDED
Status: DISCONTINUED | OUTPATIENT
Start: 2025-05-16 | End: 2025-05-16 | Stop reason: SURG

## 2025-05-16 RX ORDER — SODIUM CHLORIDE 0.9 % (FLUSH) 0.9 %
10 SYRINGE (ML) INJECTION AS NEEDED
Status: DISCONTINUED | OUTPATIENT
Start: 2025-05-16 | End: 2025-05-16 | Stop reason: HOSPADM

## 2025-05-16 RX ORDER — ALBUTEROL SULFATE 90 UG/1
INHALANT RESPIRATORY (INHALATION) AS NEEDED
Status: DISCONTINUED | OUTPATIENT
Start: 2025-05-16 | End: 2025-05-16 | Stop reason: SURG

## 2025-05-16 RX ORDER — ROCURONIUM BROMIDE 10 MG/ML
INJECTION, SOLUTION INTRAVENOUS AS NEEDED
Status: DISCONTINUED | OUTPATIENT
Start: 2025-05-16 | End: 2025-05-16 | Stop reason: SURG

## 2025-05-16 RX ORDER — ONDANSETRON 2 MG/ML
4 INJECTION INTRAMUSCULAR; INTRAVENOUS ONCE AS NEEDED
Status: DISCONTINUED | OUTPATIENT
Start: 2025-05-16 | End: 2025-05-16 | Stop reason: HOSPADM

## 2025-05-16 RX ADMIN — INDOCYANINE GREEN AND WATER 2.5 MG: KIT at 13:59

## 2025-05-16 RX ADMIN — FENTANYL CITRATE 100 MCG: 50 INJECTION, SOLUTION INTRAMUSCULAR; INTRAVENOUS at 14:13

## 2025-05-16 RX ADMIN — MIDAZOLAM HYDROCHLORIDE 2 MG: 1 INJECTION, SOLUTION INTRAMUSCULAR; INTRAVENOUS at 13:59

## 2025-05-16 RX ADMIN — KETOROLAC TROMETHAMINE 30 MG: 15 INJECTION, SOLUTION INTRAMUSCULAR; INTRAVENOUS at 14:16

## 2025-05-16 RX ADMIN — HYDROMORPHONE HYDROCHLORIDE 0.5 MG: 1 INJECTION, SOLUTION INTRAMUSCULAR; INTRAVENOUS; SUBCUTANEOUS at 15:06

## 2025-05-16 RX ADMIN — ROCURONIUM BROMIDE 50 MG: 10 INJECTION, SOLUTION INTRAVENOUS at 14:13

## 2025-05-16 RX ADMIN — SODIUM CHLORIDE 2 G: 9 INJECTION, SOLUTION INTRAVENOUS at 14:08

## 2025-05-16 RX ADMIN — ALBUTEROL SULFATE 4 PUFF: 90 AEROSOL, METERED RESPIRATORY (INHALATION) at 15:00

## 2025-05-16 RX ADMIN — SCOLOPAMINE TRANSDERMAL SYSTEM 1 PATCH: 1 PATCH, EXTENDED RELEASE TRANSDERMAL at 12:50

## 2025-05-16 RX ADMIN — PROPOFOL 200 MG: 10 INJECTION, EMULSION INTRAVENOUS at 14:13

## 2025-05-16 RX ADMIN — ACETAMINOPHEN 1000 MG: 500 TABLET ORAL at 12:50

## 2025-05-16 RX ADMIN — SODIUM CHLORIDE, POTASSIUM CHLORIDE, SODIUM LACTATE AND CALCIUM CHLORIDE: 600; 310; 30; 20 INJECTION, SOLUTION INTRAVENOUS at 14:50

## 2025-05-16 RX ADMIN — ALBUTEROL SULFATE 2 PUFF: 90 AEROSOL, METERED RESPIRATORY (INHALATION) at 14:58

## 2025-05-16 RX ADMIN — LIDOCAINE HYDROCHLORIDE 100 MG: 20 INJECTION, SOLUTION INTRAVENOUS at 14:13

## 2025-05-16 RX ADMIN — ONDANSETRON 4 MG: 2 INJECTION INTRAMUSCULAR; INTRAVENOUS at 14:16

## 2025-05-16 RX ADMIN — SODIUM CHLORIDE, POTASSIUM CHLORIDE, SODIUM LACTATE AND CALCIUM CHLORIDE 9 ML/HR: 600; 310; 30; 20 INJECTION, SOLUTION INTRAVENOUS at 12:50

## 2025-05-16 RX ADMIN — SUGAMMADEX 200 MG: 100 INJECTION, SOLUTION INTRAVENOUS at 14:50

## 2025-05-16 RX ADMIN — DEXAMETHASONE SODIUM PHOSPHATE 8 MG: 4 INJECTION, SOLUTION INTRAMUSCULAR; INTRAVENOUS at 14:16

## 2025-05-16 NOTE — ANESTHESIA POSTPROCEDURE EVALUATION
Patient: Asha Araujo    Procedure Summary       Date: 05/16/25 Room / Location: [facility] OR  / [facility]    Anesthesia Start: 1408 Anesthesia Stop: 1514    Procedure: CHOLECYSTECTOMY LAPAROSCOPIC POSSIBLE OPEN CHOLECYSTECTOMY, remove gallbladder (Abdomen) Diagnosis:       Cholecystitis with cholelithiasis      (Cholecystitis with cholelithiasis [K80.10])    Surgeons: Vahe Mueller MD Provider: Gogo Harrison MD    Anesthesia Type: general ASA Status: 3            Anesthesia Type: general    Vitals  Vitals Value Taken Time   /73 05/16/25 16:00   Temp 36.2 °C (97.2 °F) 05/16/25 15:10   Pulse 89 05/16/25 16:00   Resp 16 05/16/25 16:00   SpO2 93 % 05/16/25 16:00           Post Anesthesia Care and Evaluation    Patient location during evaluation: bedside  Patient participation: complete - patient participated  Level of consciousness: awake  Pain management: adequate    Airway patency: patent  PONV Status: none  Cardiovascular status: acceptable and stable  Respiratory status: acceptable  Hydration status: acceptable

## 2025-05-16 NOTE — ANESTHESIA PREPROCEDURE EVALUATION
Anesthesia Evaluation     Patient summary reviewed and Nursing notes reviewed   no history of anesthetic complications:   NPO Solid Status: > 8 hours  NPO Liquid Status: > 2 hours           Airway   Mallampati: I  TM distance: >3 FB  Neck ROM: full  Dental - normal exam     Pulmonary - normal exam   (+) a smoker, asthma,  Cardiovascular - negative cardio ROS and normal exam  Exercise tolerance: good (4-7 METS)        Neuro/Psych  (+) psychiatric history Anxiety and Depression  GI/Hepatic/Renal/Endo    (+) morbid obesity    Musculoskeletal (-) negative ROS    Abdominal   (+) obese   Substance History - negative use     OB/GYN negative ob/gyn ROS         Other - negative ROS       ROS/Med Hx Other: PAT Nursing Notes unavailable.               Anesthesia Plan    ASA 3     general     intravenous induction     Anesthetic plan, risks, benefits, and alternatives have been provided, discussed and informed consent has been obtained with: patient.    Plan discussed with CRNA.    CODE STATUS:

## 2025-05-16 NOTE — OP NOTE
OP NOTE  CHOLECYSTECTOMY LAPAROSCOPIC POSSIBLE OPEN CHOLECYSTECTOMY  Procedure Report    Patient Name:  Asha Araujo  YOB: 2000  [id number]    Date of Surgery:  5/16/2025     Indications: See last clinic note for indications, discussion of risk benefits and alternatives    Pre-op Diagnosis:   Cholecystitis with cholelithiasis [K80.10]       Post-Op Diagnosis Codes:     * Cholecystitis with cholelithiasis [K80.10]    Procedure:  Laparoscopic cholecystectomy with ICG cholangiography    Staff:  Surgeon(s):  Vahe Mueller MD    Assistant: Lali Giles CSA    Anesthesia: General, Local    Estimated Blood Loss: 5 mL    Implants:    Implant Name Type Inv. Item Serial No.  Lot No. LRB No. Used Action   CLIPAPPLR M/ ENDO LIGAMAX5 5MM 33CM MD/ANUEL - [id number] Implant CLIPAPPLR M/ ENDO LIGAMAX5 5MM 33CM MD/ANUEL  [employer]  DIV OF J AND J [id number] N/A 1 Implanted       Specimen:          Specimens       ID Source Type Tests Collected By Collected At Frozen?    A Gallbladder Tissue TISSUE PATHOLOGY EXAM   Vahe Mueller MD 5/16/25 8231     Description: Gallbladder and contents    This specimen was not marked as sent.              Findings: Critical view of safety obtained with aid of ICG cholangiography    Complications: None    Description of Procedure:   After all questions were answered, consent was verified.  Patient brought to the operating room per stretcher placed in supine position arms out all extremities padded.  Bilateral lower extremity SCDs placed.  Anesthesia induced.  Preoperative IV antibiotics administered.  Patient's abdomen prepped with ChloraPrep.  We waited 3 minutes.  We draped in usual sterile fashion.  Ioban applied.  Critical timeout taken.  Began the procedure with a midline incision above the umbilicus.  I entered the abdomen sharply under direct vision without injury to the viscera below.  I placed a 12 balloon trocar.  I obtain  pneumoperitoneum with CO2 insufflation.  I placed the patient in reverse Trendelenburg and rotated to the left.  I then placed 3 additional 5 trocar subxiphoid right upper quadrant right lateral quadrant under direct vision.  The gallbladder was retracted cephalad and lateral.  Using ICG cholangiography I performed L-hook and blunt dissection of the hepatocystic triangle until obtain a critical view of safety with only 2 structures seen exiting the gallbladder with an anterior and posterior window free of fibrous fatty tissue, lower third of the gallbladder taken off the cystic plate, cystic duct skeletonized.  Intraoperative timeout performed to confirm anatomy.  I placed 2 hemoclips proximal across the cystic artery 1 distal.  I divided between the 2 clips down 1 clip up.  This was repeated for the skeletonized cystic duct.  No spillage of bile or blood after division.  The gallbladder was removed with a hook lecture cautery.  It was placed in a laparoscopic retrieval device.  I infiltrated with local anesthesia bilateral upper quadrants in a tap block fashion.  We then remove the specimen bag remove the trocars desufflated in the abdomen.  I closed the umbilical fascial Vicryl.  I closed the incisions with Vicryl and staples.  Dressing applied.  Open the gallbladder on the back table with multiple stones noted.  Only 1 duct noted exiting the gallbladder.  It was sent to pathology for permanent.  At the end of the procedure all counts were correct.  I was present for the procedure.    Assistant: Lali Giles CSA was responsible for performing the following activities: Retraction, Closing, Placing Dressing, and driving camera  and their skilled assistance was necessary for the success of this case.    Vahe Mueller MD     Date: 5/16/2025  Time: 14:57 EDT

## 2025-05-16 NOTE — DISCHARGE INSTRUCTIONS
DISCHARGE INSTRUCTIONS LAPAROSCOPIC CHOLECYSTECTOMY/APPENDECTOMY  (GALL BLADDER)      For your surgery you had:  General anesthesia (you may have a sore throat for the first 24 hours)  IV sedation  Local anesthesia  Monitored anesthesia care  You received a medicated patch for nausea prevention today (behind your ear). It is recommended that you remove it 24-48 hours post-operatively. It must be removed within 72 hours.  You received an anesthesia medication today that can cause hormonal forms of birth control to be ineffective. You should use a different form of birth control (to prevent pregnancy) for 7 days.   You may experience dizziness, drowsiness, or light-headedness for several hours following surgery.  Do not stay alone tonight.  Limit your activity for 24 hours.  Resume your diet slowly.  Follow whatever special dietary instructions you may have been given by your doctor.  You should not drive, operate machinery, drink alcohol, or sign legally binding documents for 24 hours or while you are taking pain medication.  Last dose of pain medication was given at:   .    NOTIFY YOUR DOCTOR IF YOU EXPERIENCE ANY OF THE FOLLOWING:  Temperature greater than 101 degrees Fahrenheit  Shaking Chills  Redness or excessive drainage from incision  Nausea, vomiting and/or pain that is not controlled by prescribed medications  Increase in bleeding or bleeding that is excessive  Unable to urinate in 6 hours after surgery  If unable to reach your doctor, please go to the closest Emergency Room You may remove Band-Aid/dressing   .  You may shower or bathe  .  Apply an ice pack 24-48 hours.  You may experience gas discomfort 24-48 hours after discharge, especially in chest and shoulders.  Changing position frequently may alleviate this discomfort.  If you have excessive pain, swelling, redness, drainage or other problems, notify your physician.  If unable to urinate in 6 to 8 hours after surgery or urinating frequently in  small amounts, notify your doctor or go to the nearest Emergency Room.  Medications per physician instructions as indicated on Discharge Medication Information Sheet.  You should see   for follow-up care  on  .  Phone number:      SPECIAL INSTRUCTIONS:                    Call for fever, erythema, purulent drainage.  Gentle stretching starting today.  Ibuprofen with food as needed for a few days.  May shower starting Sunday.     I have read and received the above instructions.     Patient/Responsible Party's Signature Date/Time     RN Signature Date/Time

## 2025-05-20 LAB
CYTO UR: NORMAL
LAB AP CASE REPORT: NORMAL
LAB AP CLINICAL INFORMATION: NORMAL
PATH REPORT.FINAL DX SPEC: NORMAL
PATH REPORT.GROSS SPEC: NORMAL

## 2025-05-29 ENCOUNTER — OFFICE VISIT (OUTPATIENT)
Dept: SURGERY | Facility: CLINIC | Age: 25
End: 2025-05-29
Payer: COMMERCIAL

## 2025-05-29 VITALS — BODY MASS INDEX: 41.28 KG/M2 | WEIGHT: 224.3 LBS | RESPIRATION RATE: 20 BRPM | HEIGHT: 62 IN

## 2025-05-29 DIAGNOSIS — Z90.49 S/P LAPAROSCOPIC CHOLECYSTECTOMY: Primary | ICD-10-CM

## 2025-05-29 PROCEDURE — 99024 POSTOP FOLLOW-UP VISIT: CPT | Performed by: SURGERY

## 2025-05-29 NOTE — LETTER
May 30, 2025     ARON Jackson  [address] [address]    Patient: Asha Araujo   YOB: 2000   Date of Visit: 5/29/2025     Dear ARON Jackson:       Thank you for referring Asha Araujo to me for evaluation. Below are the relevant portions of my assessment and plan of care.    If you have questions, please do not hesitate to call me. I look forward to following Asha along with you.         Sincerely,        Vahe Mueller MD        CC: No Recipients    Vahe Mueller MD  05/30/25 1048  Sign when Signing Visit  General Surgery/Colorectal Surgery   Post -op Follow up Note    Patient Name:  Asha Araujo  YOB: 2000  [id number]    Referring Provider: No ref. provider found    Patient Care Team:  Cecelia Ayala APRN as PCP - General (Nurse Practitioner)    Chief complaint follow-up    Subjective.     History of present illness:    Status post laparoscopic cholecystectomy with ICG cholangiography 5/16/2025.  Pathology with mild chronic cholecystitis, focal cholesterolosis, cholelithiasis    She comes in for follow-up.  She is feeling much better.  She is pleased with her surgery.  No fever    History:  Past Medical History:   Diagnosis Date   • Anxiety    • Asthma    • Depression        Past Surgical History:   Procedure Laterality Date   • CHOLECYSTECTOMY N/A 5/16/2025    Procedure: CHOLECYSTECTOMY LAPAROSCOPIC , remove gallbladder;  Surgeon: Vahe Mueller MD;  Location: [facility] OR [facility] [place];  Service: General;  Laterality: N/A;       Family History   Problem Relation Age of Onset   • COPD Mother    • Depression Mother    • Anxiety disorder Father    • Diabetes Paternal Grandmother    • Breast cancer Neg Hx    • Ovarian cancer Neg Hx    • Uterine cancer Neg Hx    • Colon cancer Neg Hx    • Melanoma Neg Hx    • Prostate cancer Neg Hx    • Deep vein thrombosis Neg Hx    • Pulmonary embolism Neg Hx    • Malig Hyperthermia Neg Hx         Social History     Tobacco Use   • Smoking status: Never     Passive exposure: Never   • Smokeless tobacco: Never   Vaping Use   • Vaping status: Some Days   • Start date: 4/1/2020   • Substances: Flavoring   • Devices: Disposable   Substance Use Topics   • Alcohol use: Not Currently   • Drug use: Yes     Frequency: 1.0 times per week     Types: Marijuana     Comment: smoked 5-14-25       MEDS:  Prior to Admission medications    Medication Sig Start Date End Date Taking? Authorizing Provider   Airsupra 90-80 MCG/ACT aerosol Prn 4/18/25  Yes Latasha Cevallos MD   Brexpiprazole (Rexulti) 2 MG tablet Take 1 tablet by mouth Daily. 4/18/25  Yes Cecelia Ayala APRN   busPIRone (BUSPAR) 5 MG tablet Take 1 tablet by mouth 3 (Three) Times a Day As Needed (anxiety). 3/25/25  Yes Cecelia Ayala APRN   dicyclomine (BENTYL) 20 MG tablet Take 1 tablet by mouth Every 6 (Six) Hours As Needed for Abdominal Cramping. 5/4/25  Yes Samia Ray APRN   fexofenadine (ALLEGRA) 180 MG tablet Take 1 tablet by mouth Daily.   Yes Latasha Cevallos MD   FLUoxetine (PROzac) 10 MG capsule Start prozac 10 mg daily for 7 days, then increase to 20 mg daily 4/15/25  Yes Kay Ross APRN   HYDROcodone-acetaminophen (NORCO) 5-325 MG per tablet Take 1 tablet by mouth Every 6 (Six) Hours As Needed for Mild Pain. 5/16/25  Yes Vahe Mueller MD   hydrOXYzine (ATARAX) 10 MG tablet Take 1 tablet by mouth 3 (Three) Times a Day As Needed for Itching.   Yes Latasha Cevallos MD   levETIRAcetam (KEPPRA) 250 MG tablet Take 1 tablet by mouth 2 (Two) Times a Day. 3/25/25 3/25/26 Yes Cecelia Ayala APRN   metFORMIN ER (GLUCOPHAGE-XR) 500 MG 24 hr tablet Take 1 tablet by mouth Daily With Breakfast. 4/29/25  Yes Cecelia Ayala APRN   montelukast (SINGULAIR) 10 MG tablet Take 1 tablet by mouth every night at bedtime. 4/17/25  Yes Latasha Cevallos MD   norethindrone-ethinyl estradiol-ferrous fumarate (Ying 24 Fe) 1-20  MG-MCG(24) per tablet Take 1 tablet by mouth Daily. 5/13/25 5/13/26 Yes Cecelia Ayala APRN   polyethylene glycol (MIRALAX) 17 g packet Take 17 g by mouth Daily. 5/16/25  Yes Vahe Mueller MD   propranolol (INDERAL) 10 MG tablet Take 1 tablet by mouth 2 (Two) Times a Day As Needed (Anxiety Panic Attacks). 4/15/25  Yes Kay Ross APRN   vitamin D3 125 MCG (5000 UT) capsule capsule Take 1 capsule by mouth Daily. 3/25/25  Yes Cecelia Ayala APRN             No current facility-administered medications for this visit.       Allergies:  Amoxicillin    Objective    Vital Signs   Resp:  [20] 20    Physical Exam:     Incisions without evidence of infection    Results Review: I have reviewed the patient's labs and imaging    LABS/IMAGING:    Imaging Results (Last 72 Hours)       ** No results found for the last 72 hours. **             Lab Results (last 72 hours)       ** No results found for the last 72 hours. **               Result Review:Labs  Result Review  Imaging  Med Tab  Media    Assessment & Plan    * No active hospital problems. *     Status post laparoscopic cholecystectomy with ICG cholangiography 5/16/2025.  Pathology with mild chronic cholecystitis, focal cholesterolosis, cholelithiasis    I reviewed the details of the surgery with the patient.  Staples removed.  Slowly increase activity as tolerated.  Follow-up as needed.  All questions answered.  She agrees with the plan.  Thank you for the consult          Vahe Mueller MD  05/30/25 10:46 EDT

## 2025-05-30 NOTE — PROGRESS NOTES
General Surgery/Colorectal Surgery   Post -op Follow up Note    Patient Name:  Asha Araujo  YOB: 2000  [id number]    Referring Provider: No ref. provider found    Patient Care Team:  Cecelia Ayala APRN as PCP - General (Nurse Practitioner)    Chief complaint follow-up    Subjective .     History of present illness:    Status post laparoscopic cholecystectomy with ICG cholangiography 5/16/2025.  Pathology with mild chronic cholecystitis, focal cholesterolosis, cholelithiasis    She comes in for follow-up.  She is feeling much better.  She is pleased with her surgery.  No fever    History:  Past Medical History:   Diagnosis Date    Anxiety     Asthma     Depression        Past Surgical History:   Procedure Laterality Date    CHOLECYSTECTOMY N/A 5/16/2025    Procedure: CHOLECYSTECTOMY LAPAROSCOPIC , remove gallbladder;  Surgeon: Vahe Mueller MD;  [place] [facility] [place] [facility] [place]  Service: General;  Laterality: N/A;       Family History   Problem Relation Age of Onset    COPD Mother     Depression Mother     Anxiety disorder Father     Diabetes Paternal Grandmother     Breast cancer Neg Hx     Ovarian cancer Neg Hx     Uterine cancer Neg Hx     Colon cancer Neg Hx     Melanoma Neg Hx     Prostate cancer Neg Hx     Deep vein thrombosis Neg Hx     Pulmonary embolism Neg Hx     Malig Hyperthermia Neg Hx        Social History     Tobacco Use    Smoking status: Never     Passive exposure: Never    Smokeless tobacco: Never   Vaping Use    Vaping status: Some Days    Start date: 4/1/2020    Substances: Flavoring    Devices: Disposable   Substance Use Topics    Alcohol use: Not Currently    Drug use: Yes     Frequency: 1.0 times per week     Types: Marijuana     Comment: smoked 5-14-25       MEDS:  Prior to Admission medications    Medication Sig Start Date End Date Taking? Authorizing Provider   Airsupra 90-80 MCG/ACT aerosol Prn 4/18/25  Yes Provider, MD Latasha   Brexpiprazole (Rexulti) 2 MG tablet  Take 1 tablet by mouth Daily. 4/18/25  Yes Cecelia Ayala APRN   busPIRone (BUSPAR) 5 MG tablet Take 1 tablet by mouth 3 (Three) Times a Day As Needed (anxiety). 3/25/25  Yes Cecelia Ayala APRN   dicyclomine (BENTYL) 20 MG tablet Take 1 tablet by mouth Every 6 (Six) Hours As Needed for Abdominal Cramping. 5/4/25  Yes Samia Ray APRN   fexofenadine (ALLEGRA) 180 MG tablet Take 1 tablet by mouth Daily.   Yes Latasha Cevallos MD   FLUoxetine (PROzac) 10 MG capsule Start prozac 10 mg daily for 7 days, then increase to 20 mg daily 4/15/25  Yes Kay Ross APRN   HYDROcodone-acetaminophen (NORCO) 5-325 MG per tablet Take 1 tablet by mouth Every 6 (Six) Hours As Needed for Mild Pain. 5/16/25  Yes Vahe Mueller MD   hydrOXYzine (ATARAX) 10 MG tablet Take 1 tablet by mouth 3 (Three) Times a Day As Needed for Itching.   Yes Latasha Cevallos MD   levETIRAcetam (KEPPRA) 250 MG tablet Take 1 tablet by mouth 2 (Two) Times a Day. 3/25/25 3/25/26 Yes Cecelia Ayala APRN   metFORMIN ER (GLUCOPHAGE-XR) 500 MG 24 hr tablet Take 1 tablet by mouth Daily With Breakfast. 4/29/25  Yes Cecelia Ayala APRN   montelukast (SINGULAIR) 10 MG tablet Take 1 tablet by mouth every night at bedtime. 4/17/25  Yes Latasha Cevallos MD   norethindrone-ethinyl estradiol-ferrous fumarate (Ying 24 Fe) 1-20 MG-MCG(24) per tablet Take 1 tablet by mouth Daily. 5/13/25 5/13/26 Yes Cecelia Ayala APRN   polyethylene glycol (MIRALAX) 17 g packet Take 17 g by mouth Daily. 5/16/25  Yes Vahe Mueller MD   propranolol (INDERAL) 10 MG tablet Take 1 tablet by mouth 2 (Two) Times a Day As Needed (Anxiety Panic Attacks). 4/15/25  Yes Kay Ross APRN   vitamin D3 125 MCG (5000 UT) capsule capsule Take 1 capsule by mouth Daily. 3/25/25  Yes Cecelia Ayala APRN             No current facility-administered medications for this visit.       Allergies:  Amoxicillin    Objective     Vital Signs   Resp:  [20] 20    Physical  Exam:     Incisions without evidence of infection    Results Review: I have reviewed the patient's labs and imaging    LABS/IMAGING:    Imaging Results (Last 72 Hours)       ** No results found for the last 72 hours. **             Lab Results (last 72 hours)       ** No results found for the last 72 hours. **               Result Review :Labs  Result Review  Imaging  Med Tab  Media    Assessment & Plan     * No active hospital problems. *     Status post laparoscopic cholecystectomy with ICG cholangiography 5/16/2025.  Pathology with mild chronic cholecystitis, focal cholesterolosis, cholelithiasis    I reviewed the details of the surgery with the patient.  Staples removed.  Slowly increase activity as tolerated.  Follow-up as needed.  All questions answered.  She agrees with the plan.  Thank you for the consult          Vahe Mueller MD  05/30/25 10:46 EDT

## 2025-06-18 ENCOUNTER — OFFICE VISIT (OUTPATIENT)
Dept: BEHAVIORAL HEALTH | Facility: CLINIC | Age: 25
End: 2025-06-18
Payer: COMMERCIAL

## 2025-06-18 VITALS
SYSTOLIC BLOOD PRESSURE: 114 MMHG | BODY MASS INDEX: 41.41 KG/M2 | DIASTOLIC BLOOD PRESSURE: 64 MMHG | HEIGHT: 62 IN | HEART RATE: 90 BPM | WEIGHT: 225 LBS

## 2025-06-18 DIAGNOSIS — F41.0 PANIC ATTACKS: ICD-10-CM

## 2025-06-18 DIAGNOSIS — F41.1 GENERALIZED ANXIETY DISORDER: ICD-10-CM

## 2025-06-18 DIAGNOSIS — F33.1 MODERATE EPISODE OF RECURRENT MAJOR DEPRESSIVE DISORDER: Primary | ICD-10-CM

## 2025-06-18 DIAGNOSIS — F51.01 PRIMARY INSOMNIA: ICD-10-CM

## 2025-06-18 RX ORDER — TRAZODONE HYDROCHLORIDE 50 MG/1
50 TABLET ORAL NIGHTLY
Qty: 30 TABLET | Refills: 1 | Status: SHIPPED | OUTPATIENT
Start: 2025-06-18

## 2025-06-18 NOTE — PATIENT INSTRUCTIONS
1.  Please return to clinic at your next scheduled visit.  Please contact the clinic ([phone]) at least 24 hours prior in the event you need to cancel.  2.  Do no harm to yourself or others.    3.  Avoid alcohol and drugs.    4.  Take all medications as prescribed.  Please contact the clinic with any concerns. If you are in need of medication refills, please call the clinic at [phone].    5. Should you want to get in touch with your provider, ARON Blum, please contact the office ([phone]), and staff will be able to page Kay directly.  6. In the event you have personal crisis, contact the following crisis numbers: Suicide Prevention Hotline 1-891.774.4200; [facility] Helpline 9-901-139-HCFK; [facility] Emergency Room [phone]; text HELLO to 514218; or 942.     SPECIFIC RECOMMENDATIONS:     1.      Medications discussed at this encounter:     New Medications Ordered This Visit   Medications    traZODone (DESYREL) 50 MG tablet     Sig: Take 1 tablet by mouth Every Night.     Dispense:  30 tablet     Refill:  1                       2.      Psychotherapy recommendations: We will continue therapy at future visits.     3.     Return to clinic: Return in about 2 months (around 8/18/2025).

## 2025-06-18 NOTE — PROGRESS NOTES
"[facility]  Medication Management Follow-up      Record Review is below for 06/18/2025 :   1/23/2025 TSH is 3.760, free T40.88,CBC and CMP are reassuring  EKG Results:  No current or pertinent labs in record  Head Imaging:  No current or pertinent labs in record  Vital Signs:   /64   Pulse 90   Ht 157.5 cm (62.01\")   Wt 102 kg (225 lb)   BMI 41.14 kg/m²     Chief Complaint: Depression. Anxiety. Panic Attacks.     History of Present Illness:   Asha Araujo is a 24 y.o. female who presents today for follow-up and medication management for:    ICD-10-CM ICD-9-CM   1. Moderate episode of recurrent major depressive disorder  F33.1 296.32   2. Generalized anxiety disorder  F41.1 300.02   3. Panic attacks  F41.0 300.01   4. Primary insomnia  F51.01 307.42       06/18/2025 Patient is taking medications as prescribed and is tolerating them well.   Depression and Anxiety  Progression of symptoms, frequency, and intensity is waxing and waning but better overall. Patient continues to experience feelings of sadness, low mood, lost of interest in usual activities, psychomotor agitation, excessive anxiety and worry, anxiety, difficulty controlling the worry, restlessness, sleep disturbance, and these symptoms are causing significant distress or impairment. Patient denies feeling worthless, guilty, hopelessness,. Denies thinking about death and dying, suicidal ideation, planning, or intent to self-harm.  Denies AVH.  Clinically significant distress or impairment in social, occupational, or other important areas of functioning is waxing and waning but better overall.  Panic Attack  Has only taken propranolol twice since our last encounter. Progression of symptoms, frequency, and intensity is waxing and waning but better overall. The problem occurs intermittently and occasionally. Associated symptoms include sense of impending doom, unbearable foreboding that something terrible is going to happen, " "intense fear of losing control, palpitations, racing/pounding heartbeat, chest discomfort, shortness of breath, choking sensation, detachment, depersonalization, dissociation, and derealization and these symptoms are causing significant distress or impairment.   Insomnia  Sleep difficulty has been present at least 3 nights per week and for a period of at least 3 months. Patient experiences challenges difficulty falling asleep (onset insomnia) with inability to fall asleep beyond 20-30 minutes, inability to maintain sleep (maintenance/middle insomnia) , and has restless sleep, which occurs even under ideal circumstances. Is unable to sleep even with ample opportunity. Generally most people needs about 7 to 9 hours of sleep. Maintaining a similar wake up time every day, avoiding taking naps, trying to limit caffeine intake after 2pm, using bedroom for sleep and intimacy purposes only, go to bed only when sleepy, if unable to sleep while in bed for more than 20 minutes to get out of bedroom and try to do activity such as reading a book and return to bed when sleepy, encouraged daily exercise as tolerated.     04/15/2025   Generalized Anxiety Disorder (AMIE)   Patient experiences excessive anxiety and worry (apprehensive expectation), occurring more days than not for at least 6 months, about a number of events or activities (such as work or school performance). Finds it difficult to control the worry. The anxiety and worry are associated with restlessness or feeling keyed up or on edge, being easily fatigued, difficulty concentrating or mind going blank, irritability, muscle tension, and sleep disturbance (difficulty falling or staying asleep, or restless, unsatisfying sleep). The anxiety, worry, or physical symptoms cause clinically significant distress or impairment in social, occupational, or other important areas of functioning.   AMIE-7 is 20.  Depression  \"All I want to do all day is sit in the bed.\" , 8 " years, just started entertaining polyamorous relationship. Patient endorses gradually worsening feelings of sadness, low mood, and loss of interest in usual activities. These feelings are accompanied by anhedonia, change in appetite, losing or gaining weight, sleeping too much or not sleeping well (insomnia), fatigue and low energy most days, feeling worthless, guilty, and hopeless. Describes an inability to focus and concentrate that may interfere with daily tasks at home, work, or school. Movements that are unusually slow or agitated (a change which is often noticeable to others). Denies thinking about death and dying, suicidal ideation, planning, or intent to self-harm. These symptoms cause the patient clinically significant distress or impairment in social, occupational, or other important areas of functioning.  PHQ-9 is 21.  Panic Attack  The current episode started more than 1 year ago. The problem occurs every couple weeks. The problem has been worsening. Associated symptoms include sense of impending doom, unbearable foreboding that something terrible is going to happen, intense fear of losing control, palpitations, racing/pounding heartbeat, chest discomfort, shortness of breath, choking sensation, detachment, depersonalization, dissociation, excessive perspiration, and derealization.    Discontinue celexa, take 20 mg daily for 7 days then stop  Start prozac 10 mg daily for 7 days, then increase to 20 mg daily  Continue rexulti 1 mg daily  Discontinue buspar  Start propranolol 10 mg  twice daily as needed for anxiety/panic attacks    Per Referring Provider 1/23/2025 Patient is a 24-year-old female who presents today to establish care.  Previous PCP was ARON Rojas. She is due annual physical exam, to be done in office today.  Needs chronic condition management of anxiety and depression. She is on Celexa, Rexulti and Buspar. She feels stable on these at this time. She would like to be referred to  psych to for potential therapy.      She has been tested for food allergist by previous PCP. They came back positive but she is not sure what for.She was referred to allergist but they id not take her insurance at the time. She would like a new referral to go over her allergies. She has been having diarrhea and ABD pain after eating for a couple of years now. Denies any family hx of celiac. She notices raw fruits and veggies really trigger her.      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      PAP- 7/18/2023- Maritza Hopkins. OBBIANCAALVARO is filling her birth control and had prescribed Metformin for Insulin resistance, but the pt states that it bother her GI tract. She said that she has not been taking. She was RX immediate release 850, never tried XR form. She repots if she goes extended period of time without eating  she has weakness, sweating, all signs of hypoglycemia.      TDAP- today     HPV- Pt believes that she has had HPV vaccine and will check previous records.      Patient is due labs. Orders placed at today's visit. Discussed with patient that these are fasting labs.      No other concerns or complaints at this time.  Patient denies any constipation, blood in stool, urinary urgency, frequency, or dysuria, chest pain, shortness of breath or syncop    Past Psychiatric History:  Began Treatment: High school  Diagnoses: Depression, Anxiety  Psychiatrist: Denies  Therapist: Yes in high school  Admission History: [facility] in high school  Medication Trials: celexa, rexulti, buspar, hydroxyzine, prozac  Family history suicide attempts: Denies  Family history suicide completions: Denies  Suicide Attempts: Denies  Self Harm: Hx of cutting in high school, bilateral upper thighs and left forearm  Substance use/abuse: Smokes marijuana  Withdrawal Symptoms: Not applicable  Longest Period Sober: Not applicable  AA: Not applicable  Trauma/Childhood/ACE: **  Access to Firearms: Denies    Safety/Risk  Assessment: Risk of self-harm acutely and chronically is moderate to severe.    Static/Dynamic risk factors include diagnosis of mental disorder, psychosocial stressors,Previous self-harm, Recent stressor or loss, and Social factors.      MENTAL STATUS EXAM   General Appearance:  Cleanly groomed and dressed and well developed  Eye Contact:  Good eye contact  Attitude:  Cooperative and polite  Motor Activity:  Normal gait, posture  Speech:  Normal rate, tone, volume  Mood and affect:  Normal, pleasant and euthymic  Hopelessness:  Denies  Thought Process:  Logical and goal-directed  Associations/ Thought Content:  No delusions  Hallucinations:  None  Suicidal Ideations:  Not present  Homicidal Ideation:  Not present  Sensorium:  Alert  Orientation:  Person, place, time and situation  Immediate Recall, Recent, and Remote Memory:  Intact  Attention Span/ Concentration:  Good  Fund of Knowledge:  Appropriate for age and educational level  Intellectual Functioning:  Average range  Insight:  Good  Judgement:  Good  Reliability:  Good  Impulse Control:  Good       Review of systems is negative except as noted in HPI.  Labs:  WBC   Date Value Ref Range Status   05/04/2025 11.97 (H) 3.40 - 10.80 10*3/mm3 Final     Platelets   Date Value Ref Range Status   05/04/2025 293 140 - 450 10*3/mm3 Final     Hemoglobin   Date Value Ref Range Status   05/04/2025 14.0 12.0 - 15.9 g/dL Final     Hematocrit   Date Value Ref Range Status   05/04/2025 42.6 34.0 - 46.6 % Final     Glucose   Date Value Ref Range Status   05/04/2025 128 (H) 65 - 99 mg/dL Final     Creatinine   Date Value Ref Range Status   05/04/2025 0.74 0.57 - 1.00 mg/dL Final     ALT (SGPT)   Date Value Ref Range Status   05/04/2025 21 1 - 33 U/L Final     AST (SGOT)   Date Value Ref Range Status   05/04/2025 19 1 - 32 U/L Final     BUN   Date Value Ref Range Status   05/04/2025 10 6 - 20 mg/dL Final     eGFR   Date Value Ref Range Status   05/04/2025 114.6 >60.0 mL/min/1.73  Final     Total Cholesterol   Date Value Ref Range Status   01/23/2025 216 (H) 0 - 200 mg/dL Final     Triglycerides   Date Value Ref Range Status   01/23/2025 222 (H) 0 - 150 mg/dL Final     HDL Cholesterol   Date Value Ref Range Status   01/23/2025 47 40 - 60 mg/dL Final     LDL Cholesterol    Date Value Ref Range Status   01/23/2025 130 (H) 0 - 100 mg/dL Final     VLDL Cholesterol   Date Value Ref Range Status   01/23/2025 39 5 - 40 mg/dL Final     LDL/HDL Ratio   Date Value Ref Range Status   01/23/2025 2.65  Final     TSH   Date Value Ref Range Status   01/23/2025 3.760 0.270 - 4.200 uIU/mL Final     Free T4   Date Value Ref Range Status   01/23/2025 0.88 (L) 0.92 - 1.68 ng/dL Final      Pain Management Panel           No data to display               Imaging Results:  US Gallbladder  Result Date: 5/4/2025  Gallstones are seen without definite acute cholecystitis by ultrasound examination. No biliary ductal dilatation is appreciated. The other findings are as discussed above.   Portions of this note were completed with a voice recognition program.  5/4/2025 4:15 AM by Jeremie Rosado MD on Workstation: [email or web address]      Current Medications:   Current Outpatient Medications   Medication Sig Dispense Refill    Airsupra 90-80 MCG/ACT aerosol Prn      Brexpiprazole (Rexulti) 2 MG tablet Take 1 tablet by mouth Daily. 60 tablet 0    dicyclomine (BENTYL) 20 MG tablet Take 1 tablet by mouth Every 6 (Six) Hours As Needed for Abdominal Cramping. 30 tablet 0    fexofenadine (ALLEGRA) 180 MG tablet Take 1 tablet by mouth Daily.      FLUoxetine (PROzac) 10 MG capsule Start prozac 10 mg daily for 7 days, then increase to 20 mg daily 53 capsule 1    HYDROcodone-acetaminophen (NORCO) 5-325 MG per tablet Take 1 tablet by mouth Every 6 (Six) Hours As Needed for Mild Pain. 12 tablet 0    levETIRAcetam (KEPPRA) 250 MG tablet Take 1 tablet by mouth 2 (Two) Times a Day. 60 tablet 11    metFORMIN ER (GLUCOPHAGE-XR) 500 MG 24 hr tablet Take  1 tablet by mouth Daily With Breakfast. 90 tablet 1    montelukast (SINGULAIR) 10 MG tablet Take 1 tablet by mouth every night at bedtime.      norethindrone-ethinyl estradiol-ferrous fumarate (Ying 24 Fe) 1-20 MG-MCG(24) per tablet Take 1 tablet by mouth Daily. 28 tablet 12    polyethylene glycol (MIRALAX) 17 g packet Take 17 g by mouth Daily. 5 packet 0    propranolol (INDERAL) 10 MG tablet Take 1 tablet by mouth 2 (Two) Times a Day As Needed (Anxiety Panic Attacks). 60 tablet 1    vitamin D3 125 MCG (5000 UT) capsule capsule Take 1 capsule by mouth Daily. 90 capsule 1    traZODone (DESYREL) 50 MG tablet Take 1 tablet by mouth Every Night. 30 tablet 1     No current facility-administered medications for this visit.     Problem List:  Patient Active Problem List   Diagnosis    Cholecystitis with cholelithiasis     Allergy:   Allergies   Allergen Reactions    Amoxicillin Other (See Comments)     Reaction happened whenever she was a baby, unsure of the reaction      Discontinued Medications:  Medications Discontinued During This Encounter   Medication Reason    busPIRone (BUSPAR) 5 MG tablet Alternate therapy    hydrOXYzine (ATARAX) 10 MG tablet Alternate therapy       PLAN:   Presentation meets DSM-V criteria for:  Diagnoses and all orders for this visit:    1. Moderate episode of recurrent major depressive disorder (Primary)    2. Generalized anxiety disorder    3. Panic attacks    4. Primary insomnia  -     traZODone (DESYREL) 50 MG tablet; Take 1 tablet by mouth Every Night.  Dispense: 30 tablet; Refill: 1            Continue  prozac 10 mg daily   Continue rexulti 1 mg daily  Continue propranolol 10 mg  twice daily as needed for anxiety/panic attacks  Start trazodone 50 mg at bedtime  Medication Education:   PROZAC (FLUOXETINE) Risks, benefits, alternatives discussed with patient including GI upset, nausea vomiting diarrhea, theoretical decrease of seizure threshold predisposing the patient to a slightly higher  seizure risk, headaches, sexual dysfunction, serotonin syndrome, bleeding risk.  After discussion of these risks and benefits, the patient voiced understanding and agreed to proceed.  INDERAL (PROPANOLOL) Risks, benefits, alternatives discussed with patient including dizziness, sedation, falls, low blood pressure, low heart rate, possible exacerbation of asthma.  After discussion of these risks and benefits, the patient voiced understanding and agreed to proceed.  REXULTI (BREXPIPRAZOLE) Risks, benefits, alternatives discussed with patient including increased energy, exacerbation of irritability, weight gain, akathisia, GI upset, tardive dyskinesia, orthostatic hypotension.  Use care when operating vehicle, vessel, or machine. After discussion of these risks and benefits, the patient voiced understanding and agreed to proceed.  DESYREL (TRAZODONE) Risks, benefits, side effects discussed with patient including GI upset, sedation, dizziness/falls risk, grogginess the following day, prolongation of the QTc interval.  After discussion of these risks and benefits, the patient voiced understanding and agreed to proceed.      Medications:   New Medications Ordered This Visit   Medications    traZODone (DESYREL) 50 MG tablet     Sig: Take 1 tablet by mouth Every Night.     Dispense:  30 tablet     Refill:  1      CAROLYN reviewed.   Discussed medication options and treatment plan of prescribed medication as well as the risks, benefits, and side effects.  Patient is agreeable to call the office with any worsening of symptoms or onset of side effects.   Patient is agreeable to call 911 or go to the nearest ER should he/she begin having SI/HI.   Patient acknowledged, is agreeable to continue with current treatment plan, and was educated on the importance of compliance with treatment and follow-up appointments.  Addressed all questions and concerns.     Psychotherapy:    Provided minimal supportive therapy.  Functional status:  Moderate symptoms OR moderate difficulty in social occupational or social functioning (51-60)  Prognosis: Fair with expectation for some response to treatment  Progress: waxing and waning but better overall      Follow-up: Return in about 2 months (around 8/18/2025).     This document has been electronically signed by ARON Blum  June 18, 2025 10:07 EDT  Please note that portions of this note were completed with a voice recognition program.  Copied text in this note has been reviewed and is accurate as of 06/18/25

## 2025-07-14 DIAGNOSIS — F32.A DEPRESSION, UNSPECIFIED DEPRESSION TYPE: ICD-10-CM

## 2025-07-15 RX ORDER — BREXPIPRAZOLE 2 MG/1
1 TABLET ORAL DAILY
Qty: 60 TABLET | Refills: 0 | Status: SHIPPED | OUTPATIENT
Start: 2025-07-15

## 2025-07-28 DIAGNOSIS — F33.1 MODERATE EPISODE OF RECURRENT MAJOR DEPRESSIVE DISORDER: ICD-10-CM

## 2025-07-28 DIAGNOSIS — F41.1 GENERALIZED ANXIETY DISORDER: ICD-10-CM

## 2025-07-28 RX ORDER — FLUOXETINE 10 MG/1
CAPSULE ORAL
Qty: 53 CAPSULE | Refills: 1 | Status: SHIPPED | OUTPATIENT
Start: 2025-07-28

## 2025-07-28 NOTE — TELEPHONE ENCOUNTER
NEXT VISIT WITH PROVIDER Appointment with Kay Ross APRN (08/20/2025)     LAST SEEN BY PROVIDER Office Visit with Kay Ross APRN (06/18/2025)     LAST MED REFILLFLUoxetine (PROzac) 10 MG capsule (04/15/2025)

## 2025-08-13 ENCOUNTER — OFFICE VISIT (OUTPATIENT)
Dept: FAMILY MEDICINE CLINIC | Facility: CLINIC | Age: 25
End: 2025-08-13
Payer: COMMERCIAL

## 2025-08-13 VITALS
HEIGHT: 62 IN | DIASTOLIC BLOOD PRESSURE: 71 MMHG | SYSTOLIC BLOOD PRESSURE: 122 MMHG | HEART RATE: 121 BPM | WEIGHT: 219 LBS | TEMPERATURE: 98.2 F | OXYGEN SATURATION: 98 % | BODY MASS INDEX: 40.3 KG/M2

## 2025-08-13 DIAGNOSIS — J34.89 SINUS PAIN: ICD-10-CM

## 2025-08-13 DIAGNOSIS — J06.9 URI WITH COUGH AND CONGESTION: Primary | ICD-10-CM

## 2025-08-13 DIAGNOSIS — H69.93 ETD (EUSTACHIAN TUBE DYSFUNCTION), BILATERAL: ICD-10-CM

## 2025-08-13 RX ORDER — TRIAMCINOLONE ACETONIDE 40 MG/ML
40 INJECTION, SUSPENSION INTRA-ARTICULAR; INTRAMUSCULAR ONCE
Status: SHIPPED | OUTPATIENT
Start: 2025-08-13

## 2025-08-13 RX ORDER — BROMPHENIRAMINE MALEATE, PSEUDOEPHEDRINE HYDROCHLORIDE, AND DEXTROMETHORPHAN HYDROBROMIDE 2; 30; 10 MG/5ML; MG/5ML; MG/5ML
10 SYRUP ORAL 4 TIMES DAILY PRN
Qty: 473 ML | Refills: 0 | Status: SHIPPED | OUTPATIENT
Start: 2025-08-13

## 2025-08-13 RX ORDER — FLUTICASONE PROPIONATE 50 MCG
2 SPRAY, SUSPENSION (ML) NASAL DAILY
Qty: 15.8 G | Refills: 0 | Status: SHIPPED | OUTPATIENT
Start: 2025-08-13

## 2025-08-21 ENCOUNTER — OFFICE VISIT (OUTPATIENT)
Dept: FAMILY MEDICINE CLINIC | Facility: CLINIC | Age: 25
End: 2025-08-21
Payer: COMMERCIAL

## 2025-08-21 VITALS
WEIGHT: 214.2 LBS | OXYGEN SATURATION: 97 % | BODY MASS INDEX: 39.42 KG/M2 | SYSTOLIC BLOOD PRESSURE: 130 MMHG | HEIGHT: 62 IN | HEART RATE: 88 BPM | DIASTOLIC BLOOD PRESSURE: 88 MMHG

## 2025-08-21 DIAGNOSIS — E88.819 INSULIN RESISTANCE: ICD-10-CM

## 2025-08-21 DIAGNOSIS — K58.0 IRRITABLE BOWEL SYNDROME WITH DIARRHEA: ICD-10-CM

## 2025-08-21 DIAGNOSIS — R79.89 LOW T4: ICD-10-CM

## 2025-08-21 DIAGNOSIS — E78.2 MODERATE MIXED HYPERLIPIDEMIA NOT REQUIRING STATIN THERAPY: ICD-10-CM

## 2025-08-21 DIAGNOSIS — E55.9 VITAMIN D DEFICIENCY: ICD-10-CM

## 2025-08-21 DIAGNOSIS — G25.3 MYOCLONUS: ICD-10-CM

## 2025-08-21 DIAGNOSIS — E28.2 PCOS (POLYCYSTIC OVARIAN SYNDROME): Primary | ICD-10-CM

## 2025-08-21 DIAGNOSIS — N91.4 SECONDARY OLIGOMENORRHEA: ICD-10-CM

## 2025-08-21 LAB
25(OH)D3 SERPL-MCNC: 73.2 NG/ML (ref 30–100)
ALBUMIN SERPL-MCNC: 4.3 G/DL (ref 3.5–5.2)
ALBUMIN/GLOB SERPL: 1.3 G/DL
ALP SERPL-CCNC: 100 U/L (ref 39–117)
ALT SERPL W P-5'-P-CCNC: 25 U/L (ref 1–33)
ANION GAP SERPL CALCULATED.3IONS-SCNC: 12 MMOL/L (ref 5–15)
AST SERPL-CCNC: 23 U/L (ref 1–32)
BILIRUB SERPL-MCNC: 0.2 MG/DL (ref 0–1.2)
BUN SERPL-MCNC: 5 MG/DL (ref 6–20)
BUN/CREAT SERPL: 6.4 (ref 7–25)
CALCIUM SPEC-SCNC: 9.6 MG/DL (ref 8.6–10.5)
CHLORIDE SERPL-SCNC: 106 MMOL/L (ref 98–107)
CHOLEST SERPL-MCNC: 212 MG/DL (ref 0–200)
CO2 SERPL-SCNC: 21 MMOL/L (ref 22–29)
CREAT SERPL-MCNC: 0.78 MG/DL (ref 0.57–1)
EGFRCR SERPLBLD CKD-EPI 2021: 106.9 ML/MIN/1.73
GLOBULIN UR ELPH-MCNC: 3.2 GM/DL
GLUCOSE SERPL-MCNC: 90 MG/DL (ref 65–99)
HBA1C MFR BLD: 5.6 % (ref 4.8–5.6)
HDLC SERPL-MCNC: 49 MG/DL (ref 40–60)
LDLC SERPL CALC-MCNC: 136 MG/DL (ref 0–100)
LDLC/HDLC SERPL: 2.71 {RATIO}
POTASSIUM SERPL-SCNC: 4 MMOL/L (ref 3.5–5.2)
PROT SERPL-MCNC: 7.5 G/DL (ref 6–8.5)
SODIUM SERPL-SCNC: 139 MMOL/L (ref 136–145)
T4 FREE SERPL-MCNC: 1.07 NG/DL (ref 0.92–1.68)
TRIGL SERPL-MCNC: 152 MG/DL (ref 0–150)
TSH SERPL DL<=0.05 MIU/L-ACNC: 2.9 UIU/ML (ref 0.27–4.2)
VLDLC SERPL-MCNC: 27 MG/DL (ref 5–40)

## 2025-08-21 PROCEDURE — 80061 LIPID PANEL: CPT

## 2025-08-21 PROCEDURE — 82306 VITAMIN D 25 HYDROXY: CPT

## 2025-08-21 PROCEDURE — 84443 ASSAY THYROID STIM HORMONE: CPT

## 2025-08-21 PROCEDURE — 84439 ASSAY OF FREE THYROXINE: CPT

## 2025-08-21 PROCEDURE — 80053 COMPREHEN METABOLIC PANEL: CPT

## 2025-08-21 PROCEDURE — 83036 HEMOGLOBIN GLYCOSYLATED A1C: CPT

## 2025-08-21 RX ORDER — COLESTIPOL HYDROCHLORIDE 1 G/1
1 TABLET ORAL 2 TIMES DAILY
Qty: 30 TABLET | Refills: 0 | Status: SHIPPED | OUTPATIENT
Start: 2025-08-21

## (undated) DEVICE — SOL IRR H2O BO 1000ML STRL

## (undated) DEVICE — STERILE POLYISOPRENE POWDER-FREE SURGICAL GLOVES WITH EMOLLIENT COATING: Brand: PROTEXIS

## (undated) DEVICE — SYR LUERLOK 30CC

## (undated) DEVICE — SUT VIC 0 UR6 27IN VCP603H

## (undated) DEVICE — STERILE POLYISOPRENE POWDER-FREE SURGICAL GLOVES: Brand: PROTEXIS

## (undated) DEVICE — ENDOPATH XCEL WITH OPTIVIEW TECHNOLOGY BLADELESS TROCARS WITH STABILITY SLEEVES: Brand: ENDOPATH XCEL OPTIVIEW

## (undated) DEVICE — ST TBG CONN PNEUMOCLEAR EVAC SMOKE HEAT/HUMID

## (undated) DEVICE — TISSUE RETRIEVAL SYSTEM: Brand: INZII RETRIEVAL SYSTEM

## (undated) DEVICE — ENDOPATH XCEL WITH OPTIVIEW TECHNOLOGY UNIVERSAL TROCAR STABILITY SLEEVES: Brand: ENDOPATH XCEL OPTIVIEW

## (undated) DEVICE — SLV SCD KN/LEN ADJ EXPRSS BLENDED MD 1P/U

## (undated) DEVICE — 2, DISPOSABLE SUCTION/IRRIGATOR WITHOUT DISPOSABLE TIP: Brand: STRYKEFLOW

## (undated) DEVICE — NON-WOVEN ADHESIVE WOUND DRESSING: Brand: PRIMAPORE ADHESIVE WOUND DRSG 7.2*5CM

## (undated) DEVICE — 3M™ IOBAN™ 2 ANTIMICROBIAL INCISE DRAPE 6650EZ: Brand: IOBAN™ 2

## (undated) DEVICE — TROCARS: Brand: KII® BALLOON BLUNT TIP SYSTEM

## (undated) DEVICE — LAPAROSCOPIC ELECTRODE WITH A 3/32" PIN CONNECTOR, L-HOOK 5 MM X 27 CM: Brand: CONMED

## (undated) DEVICE — GOWN,SIRUS,POLYRNF,BRTHSLV,XL,30/CS: Brand: MEDLINE

## (undated) DEVICE — SUT MNCRYL 4/0 PS2 18 IN

## (undated) DEVICE — LAPAROSCOPIC SMOKE EVACUATION SYSTEM ACTIVE AND PASSIVE: Brand: VALLEYLAB

## (undated) DEVICE — LAPAROVUE VISIBILITY SYSTEM LAPAROSCOPIC SOLUTIONS: Brand: LAPAROVUE

## (undated) DEVICE — GLV SURG BIOGEL LTX PF 7 1/2

## (undated) DEVICE — THE STERILE LIGHT HANDLE COVER IS USED WITH STERIS SURGICAL LIGHTING AND VISUALIZATION SYSTEMS.

## (undated) DEVICE — GENERAL LAPAROSCOPY-LF: Brand: MEDLINE INDUSTRIES, INC.

## (undated) DEVICE — LIGAMAX 5 MM ENDOSCOPIC MULTIPLE CLIP APPLIER
Type: IMPLANTABLE DEVICE | Site: ABDOMEN | Status: NON-FUNCTIONAL
Brand: LIGAMAX

## (undated) DEVICE — SYR LL TP 10ML STRL

## (undated) DEVICE — PENCL ES MEGADINE EZ/CLEAN BUTN W/HOLSTR 10FT

## (undated) DEVICE — LAPAROSCOPIC SCISSORS: Brand: EPIX LAPAROSCOPIC SCISSORS